# Patient Record
Sex: FEMALE | Race: WHITE | Employment: OTHER | URBAN - METROPOLITAN AREA
[De-identification: names, ages, dates, MRNs, and addresses within clinical notes are randomized per-mention and may not be internally consistent; named-entity substitution may affect disease eponyms.]

---

## 2017-02-22 ENCOUNTER — HOSPITAL ENCOUNTER (OUTPATIENT)
Dept: WOMENS IMAGING | Age: 82
Discharge: HOME | End: 2017-02-22
Admitting: INTERNAL MEDICINE

## 2017-02-22 DIAGNOSIS — M89.8X9 OTHER DISORDERS OF BONE AND CARTILAGE: ICD-10-CM

## 2017-02-22 DIAGNOSIS — M94.9 DISORDER OF CARTILAGE: ICD-10-CM

## 2017-02-22 DIAGNOSIS — Z12.31 VISIT FOR SCREENING MAMMOGRAM: ICD-10-CM

## 2017-02-22 DIAGNOSIS — M94.8X9 OTHER DISORDERS OF BONE AND CARTILAGE: ICD-10-CM

## 2017-04-27 ENCOUNTER — TELEPHONE (OUTPATIENT)
Dept: PULMONOLOGY | Age: 82
End: 2017-04-27

## 2017-04-27 DIAGNOSIS — J45.909 ASTHMA IN PEDIATRIC PATIENT, UNSPECIFIED ASTHMA SEVERITY, UNCOMPLICATED: Primary | ICD-10-CM

## 2017-05-08 ENCOUNTER — HOSPITAL ENCOUNTER (OUTPATIENT)
Dept: PULMONOLOGY | Age: 82
Discharge: HOME | End: 2017-05-08
Admitting: INTERNAL MEDICINE

## 2017-05-08 DIAGNOSIS — J45.909 ASTHMA IN PEDIATRIC PATIENT, UNSPECIFIED ASTHMA SEVERITY, UNCOMPLICATED: ICD-10-CM

## 2017-05-08 DIAGNOSIS — J44.1 CHRONIC OBSTRUCTIVE PULMONARY DISEASE WITH ACUTE EXACERBATION (HCC): ICD-10-CM

## 2017-05-08 DIAGNOSIS — J44.1 OBSTRUCTIVE CHRONIC BRONCHITIS WITH EXACERBATION (HCC): ICD-10-CM

## 2017-05-08 DIAGNOSIS — R06.02 BREATH SHORTNESS: ICD-10-CM

## 2017-05-08 LAB
DLCO %PRED: NORMAL
DLCO PRE: NORMAL
FEF 25-75%-POST: NORMAL
FEF 25-75%-PRE: NORMAL
FEV1-POST: NORMAL
FEV1-PRE: NORMAL
FEV1/FVC-POST: NORMAL
FEV1/FVC-PRE: NORMAL
FVC-POST: NORMAL
FVC-PRE: NORMAL
MEP: NORMAL
MIP: NORMAL
MVV %PRED-PRE: NORMAL
MVV-PRE: NORMAL
TLC %PRED: NORMAL
TLC PRE: NORMAL

## 2017-05-08 PROCEDURE — 94060 EVALUATION OF WHEEZING: CPT | Performed by: INTERNAL MEDICINE

## 2017-05-08 PROCEDURE — 94727 GAS DIL/WSHOT DETER LNG VOL: CPT | Performed by: INTERNAL MEDICINE

## 2017-05-08 PROCEDURE — 94729 DIFFUSING CAPACITY: CPT | Performed by: INTERNAL MEDICINE

## 2017-05-08 RX ORDER — ALBUTEROL SULFATE 90 UG/1
4 AEROSOL, METERED RESPIRATORY (INHALATION) ONCE
Status: COMPLETED | OUTPATIENT
Start: 2017-05-08 | End: 2017-05-08

## 2017-05-08 RX ADMIN — ALBUTEROL SULFATE 4 PUFF: 90 AEROSOL, METERED RESPIRATORY (INHALATION) at 12:02

## 2017-06-28 ENCOUNTER — OFFICE VISIT (OUTPATIENT)
Dept: PULMONOLOGY | Age: 82
End: 2017-06-28

## 2017-06-28 VITALS
DIASTOLIC BLOOD PRESSURE: 64 MMHG | SYSTOLIC BLOOD PRESSURE: 138 MMHG | HEIGHT: 60 IN | BODY MASS INDEX: 28.27 KG/M2 | TEMPERATURE: 98.2 F | HEART RATE: 66 BPM | OXYGEN SATURATION: 93 % | RESPIRATION RATE: 16 BRPM | WEIGHT: 144 LBS

## 2017-06-28 DIAGNOSIS — J44.9 CHRONIC BRONCHITIS WITH COPD (CHRONIC OBSTRUCTIVE PULMONARY DISEASE) (HCC): Primary | ICD-10-CM

## 2017-06-28 PROCEDURE — 99204 OFFICE O/P NEW MOD 45 MIN: CPT | Performed by: INTERNAL MEDICINE

## 2017-06-28 PROCEDURE — 4040F PNEUMOC VAC/ADMIN/RCVD: CPT | Performed by: INTERNAL MEDICINE

## 2017-06-28 PROCEDURE — 3023F SPIROM DOC REV: CPT | Performed by: INTERNAL MEDICINE

## 2017-06-28 PROCEDURE — 1090F PRES/ABSN URINE INCON ASSESS: CPT | Performed by: INTERNAL MEDICINE

## 2017-06-28 PROCEDURE — 1036F TOBACCO NON-USER: CPT | Performed by: INTERNAL MEDICINE

## 2017-06-28 PROCEDURE — G8926 SPIRO NO PERF OR DOC: HCPCS | Performed by: INTERNAL MEDICINE

## 2017-06-28 PROCEDURE — G8419 CALC BMI OUT NRM PARAM NOF/U: HCPCS | Performed by: INTERNAL MEDICINE

## 2017-06-28 PROCEDURE — G8427 DOCREV CUR MEDS BY ELIG CLIN: HCPCS | Performed by: INTERNAL MEDICINE

## 2017-06-28 RX ORDER — VALSARTAN 160 MG/1
TABLET ORAL
COMMUNITY
Start: 2017-05-23 | End: 2018-10-23 | Stop reason: ALTCHOICE

## 2017-06-28 RX ORDER — PRAVASTATIN SODIUM 40 MG
40 TABLET ORAL DAILY
COMMUNITY
Start: 2017-05-01 | End: 2021-02-04 | Stop reason: ALTCHOICE

## 2017-06-28 RX ORDER — ISOSORBIDE MONONITRATE 30 MG/1
30 TABLET, EXTENDED RELEASE ORAL DAILY
COMMUNITY
Start: 2017-05-26

## 2017-06-28 RX ORDER — DEXAMETHASONE 4 MG/1
TABLET ORAL 2 TIMES DAILY
COMMUNITY
Start: 2017-05-26 | End: 2018-08-07 | Stop reason: SDUPTHER

## 2017-06-28 RX ORDER — M-VIT,TX,IRON,MINS/CALC/FOLIC 27MG-0.4MG
1 TABLET ORAL DAILY
COMMUNITY
End: 2017-10-30 | Stop reason: ALTCHOICE

## 2017-06-28 RX ORDER — ASPIRIN 325 MG
325 TABLET ORAL DAILY
COMMUNITY
End: 2021-02-04 | Stop reason: DRUGHIGH

## 2017-06-28 RX ORDER — CALCIUM CARBONATE 500(1250)
500 TABLET ORAL 2 TIMES DAILY
COMMUNITY

## 2017-06-28 RX ORDER — LEVOTHYROXINE SODIUM 0.15 MG/1
150 TABLET ORAL DAILY
COMMUNITY
Start: 2017-05-29

## 2017-06-28 RX ORDER — SOTALOL HYDROCHLORIDE 80 MG/1
80 TABLET ORAL 2 TIMES DAILY
Status: ON HOLD | COMMUNITY
Start: 2017-05-26 | End: 2022-09-18 | Stop reason: HOSPADM

## 2017-06-28 RX ORDER — MONTELUKAST SODIUM 10 MG/1
10 TABLET ORAL NIGHTLY
COMMUNITY
Start: 2017-05-22 | End: 2022-09-16

## 2017-06-28 RX ORDER — IPRATROPIUM BROMIDE AND ALBUTEROL SULFATE 2.5; .5 MG/3ML; MG/3ML
1 SOLUTION RESPIRATORY (INHALATION) EVERY 6 HOURS PRN
COMMUNITY
End: 2017-06-28

## 2017-06-28 RX ORDER — ALENDRONATE SODIUM 70 MG/1
70 TABLET ORAL WEEKLY
COMMUNITY
Start: 2017-05-16

## 2017-06-28 RX ORDER — EZETIMIBE 10 MG/1
10 TABLET ORAL DAILY
COMMUNITY

## 2017-06-28 RX ORDER — MULTIVIT WITH MINERALS/LUTEIN
1000 TABLET ORAL DAILY
COMMUNITY

## 2017-06-28 RX ORDER — ALPRAZOLAM 0.25 MG/1
0.25 TABLET ORAL PRN
COMMUNITY
Start: 2017-04-26 | End: 2022-09-16

## 2017-06-28 RX ORDER — ESCITALOPRAM OXALATE 5 MG/1
5 TABLET ORAL DAILY
COMMUNITY
Start: 2017-05-18 | End: 2019-05-09 | Stop reason: ALTCHOICE

## 2017-06-28 RX ORDER — UMECLIDINIUM BROMIDE AND VILANTEROL TRIFENATATE 62.5; 25 UG/1; UG/1
POWDER RESPIRATORY (INHALATION)
COMMUNITY
Start: 2017-06-05 | End: 2017-12-14 | Stop reason: SDUPTHER

## 2017-07-05 ENCOUNTER — TELEPHONE (OUTPATIENT)
Dept: PULMONOLOGY | Age: 82
End: 2017-07-05

## 2017-07-05 DIAGNOSIS — J44.9 OBSTRUCTIVE CHRONIC BRONCHITIS WITHOUT EXACERBATION (HCC): Primary | ICD-10-CM

## 2017-07-05 DIAGNOSIS — J44.9 OBSTRUCTIVE CHRONIC BRONCHITIS WITHOUT EXACERBATION (HCC): ICD-10-CM

## 2017-07-05 RX ORDER — ALBUTEROL SULFATE 2.5 MG/3ML
SOLUTION RESPIRATORY (INHALATION)
Qty: 1650 ML | Refills: 3 | Status: SHIPPED | OUTPATIENT
Start: 2017-07-05 | End: 2018-09-28 | Stop reason: SDUPTHER

## 2017-07-05 RX ORDER — ALBUTEROL SULFATE 2.5 MG/3ML
2.5 SOLUTION RESPIRATORY (INHALATION) 4 TIMES DAILY
Qty: 120 EACH | Refills: 3 | Status: SHIPPED | OUTPATIENT
Start: 2017-07-05 | End: 2017-07-05 | Stop reason: SDUPTHER

## 2017-10-30 ENCOUNTER — OFFICE VISIT (OUTPATIENT)
Dept: PULMONOLOGY | Age: 82
End: 2017-10-30

## 2017-10-30 VITALS
WEIGHT: 156 LBS | HEIGHT: 60 IN | OXYGEN SATURATION: 94 % | SYSTOLIC BLOOD PRESSURE: 136 MMHG | HEART RATE: 70 BPM | TEMPERATURE: 98.1 F | BODY MASS INDEX: 30.63 KG/M2 | DIASTOLIC BLOOD PRESSURE: 82 MMHG | RESPIRATION RATE: 16 BRPM

## 2017-10-30 DIAGNOSIS — J44.9 CHRONIC BRONCHITIS WITH COPD (CHRONIC OBSTRUCTIVE PULMONARY DISEASE) (HCC): Primary | ICD-10-CM

## 2017-10-30 DIAGNOSIS — J31.0 CHRONIC RHINITIS, UNSPECIFIED TYPE: ICD-10-CM

## 2017-10-30 DIAGNOSIS — Z23 NEED FOR VACCINATION FOR PNEUMOCOCCUS: ICD-10-CM

## 2017-10-30 PROCEDURE — 1036F TOBACCO NON-USER: CPT | Performed by: INTERNAL MEDICINE

## 2017-10-30 PROCEDURE — G8427 DOCREV CUR MEDS BY ELIG CLIN: HCPCS | Performed by: INTERNAL MEDICINE

## 2017-10-30 PROCEDURE — 90670 PCV13 VACCINE IM: CPT | Performed by: INTERNAL MEDICINE

## 2017-10-30 PROCEDURE — G8926 SPIRO NO PERF OR DOC: HCPCS | Performed by: INTERNAL MEDICINE

## 2017-10-30 PROCEDURE — G8484 FLU IMMUNIZE NO ADMIN: HCPCS | Performed by: INTERNAL MEDICINE

## 2017-10-30 PROCEDURE — G0009 ADMIN PNEUMOCOCCAL VACCINE: HCPCS | Performed by: INTERNAL MEDICINE

## 2017-10-30 PROCEDURE — 4040F PNEUMOC VAC/ADMIN/RCVD: CPT | Performed by: INTERNAL MEDICINE

## 2017-10-30 PROCEDURE — 1090F PRES/ABSN URINE INCON ASSESS: CPT | Performed by: INTERNAL MEDICINE

## 2017-10-30 PROCEDURE — G8417 CALC BMI ABV UP PARAM F/U: HCPCS | Performed by: INTERNAL MEDICINE

## 2017-10-30 PROCEDURE — G8399 PT W/DXA RESULTS DOCUMENT: HCPCS | Performed by: INTERNAL MEDICINE

## 2017-10-30 PROCEDURE — 99213 OFFICE O/P EST LOW 20 MIN: CPT | Performed by: INTERNAL MEDICINE

## 2017-10-30 PROCEDURE — 3023F SPIROM DOC REV: CPT | Performed by: INTERNAL MEDICINE

## 2017-10-30 PROCEDURE — 1123F ACP DISCUSS/DSCN MKR DOCD: CPT | Performed by: INTERNAL MEDICINE

## 2017-10-30 NOTE — PROGRESS NOTES
PH, PCO2, PO2, HCO3, BE, O2SAT in the last 72 hours. Chest imaging reports were reviewed and imaging was reviewed by me and showed   5.8. 17. Ct chest.  No emphysema. Expiratory phase of high-resolution CT suboptimal.    Assessment:       · Chronic bronchitis versus asthmatic bronchitis  · Chronic rhinitis    Plan:         1. Chronic bronchitis with COPD (chronic obstructive pulmonary disease) ([address]  She is having increasing symptoms with Anoro and Flovent bid. Since she has excess Flovent, will not change this. Will assess for o2 need with 6 MWT and attempt to improve symptoms with pulm rehab. - 6 MIN WALK TEST  - Ambulatory referral to Pulmonary Rehab    2. Need for vaccination for pneumococcus  - Pneumococcal conjugate vaccine 13-valent IM (PREVNAR 13)    3. Chronic rhinitis  Currently controlled. No change.

## 2017-10-30 NOTE — Clinical Note
I am going to assess for oxygen need with 6 min walk test and refer to pulm rehab. See full note attached.   Thanks, Yasmin Fletcher

## 2017-11-17 ENCOUNTER — HOSPITAL ENCOUNTER (OUTPATIENT)
Dept: CARDIAC REHAB | Age: 82
Discharge: HOME | End: 2017-11-17
Attending: INTERNAL MEDICINE | Admitting: INTERNAL MEDICINE

## 2017-11-17 DIAGNOSIS — J44.1 CHRONIC OBSTRUCTIVE PULMONARY DISEASE WITH ACUTE EXACERBATION (HCC): ICD-10-CM

## 2017-11-17 DIAGNOSIS — J44.9 COPD, MILD (HCC): ICD-10-CM

## 2017-11-17 PROCEDURE — 94620 PR PULMONARY STRESS TESTING,SIMPLE: CPT | Performed by: INTERNAL MEDICINE

## 2017-11-21 ENCOUNTER — HOSPITAL ENCOUNTER (OUTPATIENT)
Dept: CARDIAC REHAB | Age: 82
Discharge: HOME | End: 2017-11-30
Attending: INTERNAL MEDICINE | Admitting: INTERNAL MEDICINE

## 2017-11-21 VITALS — BODY MASS INDEX: 29.29 KG/M2 | WEIGHT: 150 LBS

## 2017-11-21 PROBLEM — J44.9 COPD, MILD (HCC): Status: ACTIVE | Noted: 2017-11-21

## 2017-11-21 NOTE — PROGRESS NOTES
expect this patient to improve in a reasonable and predictable time frame. Other Program Components:   (X)6 Minute Walk Test (6 MWT) at program discharge   (X)Evaluation for oxygen needs while exercising   (X)Titrate Oxygen to maintain >88 SpO2   (X)Stop Exercise or Apply Oxygen if patient desaturates <88%   (x)May continue in the Maintenance Program upon completion    Measurable Endurance Goal:    -Aerobic endurance goal to be measured in minutes. Start endurance training per patient tolerance at 1-15 minutes per exercise type, progressing to a total of 31-60 minutes using various modes of training (see Exercise Prescription on Individualized Treatment Plan 'ITP'). -Measurable Muscular Strength Goal: Starting at 1-3 lbs x 8 reps, progressing daily/weekly to 5-10 lbs x 15 reps    NOTE: Juan Kimbrough's tobacco History:   History   Smoking Status    Former Smoker    Types: Cigarettes   Smokeless Tobacco    Never Used     Comment: quit 45 years ago - socially     If patient is a current smoker, patient will participate in tobacco cessation program during Pulmonary Rehab.       Physician Signature/Date/Time:

## 2017-11-30 ENCOUNTER — HOSPITAL ENCOUNTER (OUTPATIENT)
Dept: CARDIAC REHAB | Age: 82
Discharge: HOME | End: 2017-12-01
Admitting: INTERNAL MEDICINE

## 2017-11-30 VITALS — BODY MASS INDEX: 29.8 KG/M2 | WEIGHT: 152.6 LBS

## 2017-11-30 NOTE — PROGRESS NOTES
Exercise Class  []  Attended Resistance Training Class                                                    GOALS                                                                                           Rate your physical   fitness (PF)?:   /10    -30 day PF goal:  /10    EDUCATION  [] Attended all individually relevant exercise education sessions? []  Knows current exercise goals and recmndtns?:                                                  Goals Achieved? Rate your physical fitness now?:     /10  Handgrip:  Right:  Left:    Discharge  EDUCATION  []  Attended all individually relevant exercise education sessions?    [] Knows current exercise goals and recmndtns?:                                                                                        PHYSICIAN DIRECTED   EXERCISE RX     RPE : 11 - 14  Titrate O2 to keep SpO2 >89%    Frequency (F): 2-3x/wk in Rehab,         Initial Intensity : 2.4 METs (from 6MWT)   3.4-4.6 (60-80% of walk speed for TM)    Type (T): Aerobic (TM,NS, SF, AE, AB, RB, EL, Arc), RT 1-3#, 8-16 reps    CAN USE ALL EQUIPMENT EXCEPT       Time (Ti): Aerobic:   15-40 min               Progression: 1-2 min total time for TM, AB, NS, SF or Arm ergometer per session or when a steady state has occurred in RPE if  SpO2 and HR levels are acceptable           PHYSICIAN DIRECTED   EXERCISE RX       Titrate O2 to keep SpO2 >89%    Frequency (F): 2-3x/wk in Rehab, 1-3x/wk home walking       Intensity (I):  Initial + 5-10% increase each week or when a steady state has occurred in RPE if  SpO2 and HR levels are acceptable  Type (T)  Aerobic (TM,NS, SFR,SFS, AE, AB, RB), RT   8-16 reps    CAN USE ALL EQUIPMENT EXCEPT        Time (Ti): Aerobic:   30-40 min        Progression:   1-2 min total time for TM, AB, NS, SF or Arm ergometer per session or when a steady state has occurred in RPE if  SpO2 and HR levels are acceptable        Is pt. progressing in rehab?:               PHYSICIAN DIRECTED   EXERCISE RX       Titrate O2 to keep SpO2 >89%    Frequency (F): 2-3x/wk in Rehab, 1-3x/wk home walking       Intensity (I):  Initial + 5-10% increase each week when a steady state has occurred in RPE if  SpO2 and HR levels are acceptable  Type (T)  Aerobic (TM,NS, SFR,SFS, AE, AB, RB), RT   8-16 reps    CAN USE ALL EQUIPMENT EXCEPT        Time (Ti): Aerobic:   30-50 min     Progression:   1-2 min total time for TM, AB, NS, SF or Arm ergometer per session or when a steady state has occurred in RPE if  SpO2 and HR levels are acceptable              Is pt. progressing in rehab?:                 PHYSICIAN DIRECTED   EXERCISE RX       Titrate O2 to keep SpO2 >89%    Frequency (F): 2-3x/wk in Rehab, 1-3x/wk home walking       Intensity (I):  Initial + 5-10% increase each week when a steady state has occurred in RPE if  SpO2 and HR levels are acceptable  Type (T):   Aerobic (TM,NS, SFR,SFS, AE, AB, RB), RT   8-16 reps    CAN USE ALL EQUIPMENT EXCEPT          Time (Ti): Aerobic:   30-58 min         Progression:   1-2 min total time for TM, AB, NS, SF or Arm ergometer per session or when a steady state has occurred in RPE if  SpO2 and HR levels are acceptable            Is pt. progressing in rehab?:               Final Intensity (I): See below and final 6MWT above            ACHIEVED TOTAL AEROBIC EXERCISE TIME:  min         FINAL LEVELS:  [] TM: min  [] Nustep: level  min  [] Arm ergometer: ureña min  [] Scifit: level min  [] HW :  # x  reps  [] Dy:    X   reps  [] Cybex RT:    # x   Reps  [] Eliptical:level  X  Min  [] Arc: level   X    mins  [] RB:  Level  X   mins  [] AB: level   X     Min              Did pt. progress in rehab?:     30 DAY TARGET GOAL  [x] Start slowly but progress each week  [x] Increase duration on the equipment  [x] Start resistance training    -30 day PF goal: 7/10                 Current Home Exercise :None    Frequency:      Type:                     Health Knowledge   Test Score:  24/25     Current Home Exercise:   Frequency:      Type:         Time:  min                                  Current Home Exercise:   Frequency:       Type:         Time:  min                                Current Home Exercise:   Frequency:       Type:         Time:  min                                                      Discharge exercise plan                                  Repeat Health Knowledge Test Score :    /25     [facility] INDIVIDUAL TREATMENT PLAN  SMOKING AND   OTHER COMPONENTS    Smoking/Other  Components   Initial Assessment  Day 1 Smoking/Other  Components  Intervention  Day 2-30 Smoking/Other  Components  Re-Assessment  Day 31-60 Smoking/Other  Components  Re-Assessment Day 61-90+ Smoking/Other  Components  Discharge  Final Day   Tobacco Use Hx  [x] Y  [] N?:   Quit Date: 2007  Cig/Day: 5-6/day  Years:20  Tobacco Use  Any change in Smoking Status?:  No  []  not smoking  Quit Date:   (if applicable)  Intervention  []  Information/ed material given on cessation techniques  []  smoking cessation class schedule given Tobacco Use  Any change in Smoking Status?:      Quit Date:   (if applicable)          Intervention  [] Referral to Tobacco Cessation Specialist (TCS) Tobacco Use  Any change in Smoking Status?:     Quit Date:   (if applicable)        Intervention Tobacco Use  Any change in Smoking Status?:     Quit Date:   (if applicable)          Intervention Tobacco Use  Any change in Smoking Status?:   Quit Date:   (if applicable)            Intervention  [] Pt used TCS counseling           Other  Components:    [x]  Environmental Factors    [x]  Prevention Management of Exacerbations    []  CHF Management    []  Hypertension Management     Intervention/  Education                [facility] INDIVIDUAL TREATMENT PLAN  OXYGEN AND MEDICATIONS    OXYGEN  MEDICATIONS   Initial Assessment  Day 1 prescribed meds    Respiratory Medications    []  Proper use and technique of MDI, DPI and/or nebs  []  Incorrect use and technique of MDI, DPI and /or nebs    Hypoxemia  Problem:      Current Oxygen Prescription:    l/min rest   l/min exercise   titration plan/weaning plan:    Goals:   []  Manage Hypoxemia  []  receive adequate portable system  [] Compliant with use as prescribed  []  Learn O2 safety guidelines     Medications    [] Compliant  []  Noncompliant       Respiratory Medications    []  Compliant  [] Noncompliant              Hypoxemia  Problem:    []  Compliant  []  Noncompliant    Final Oxygen Prescription:    l/min rest   l/min exercise                                                           [facility] INDIVIDUAL TREATMENT PLAN  NUTRITION DOMAIN:        NUTRITION   Initial Assessment  Day 1 NUTRITION   Intervention  Day 2-30 NUTRITION   Re-Assessment  Day 31-60 NUTRITION   Re-Assessment Day 61-90+ NUTRITION Discharge  Final Day   Weight Management:  150 lbs  Current BMI: 32.5 Weight Management:   lbs  Current BMI Weight Management:    lbs  Current BMI Weight Management:    lbs  Current BMI Weight Management:    lbs  Current BMI   Diabetes?: No  A1C   Fasting BS:   Insulin?:    Oral agent? Diabetes:  If y, has patient seen a positive trend in FBS?:      Intervention    [x] Basic nutrition education   [] Referral to Diabetes Education? [] Referral to weightloss/nutrition education     Intervention    [] Pt has had Nutrition class? [] Informal questions asked regarding nutrition/weight control Intervention    []  Pt has had Nutrition class? [] Questions addressed Intervention    []  Pt has had Nutrition class?    Intervention    Pt aware of:     [] Pulmonary eating techniques   [] Smart choices, Calories   []Gas-producing foods   [] Wt gain / loss strategies     GOALS    Weight Loss         30 DAY TARGET GOAL:    [] Decrease portion size by 250-500 calories/day  [] Increase fluid intake to 6-8 mood: GOALS        Did pt meet previous 30 day [employer] Goal(s)?:      RE- ASSESSMENT GOAL    [] Decrease/  Maintain anxiety and depression level  [] Increase ability to complete ADL  -          (0 being 'None', 10 being 'Very'),  -How would you rate your Anxiety Level:      -How would you rate your level of depression:    -How would you rate your mood:     Discharge            Did pts SF-36 Mental Score increase?:          [] Pt is aware of the s/s of depression    [] Received Psychosocial Education    Any follow up with physicians  necessary?:      [] Y    [] N               [facility] INDIVIDUAL TREATMENT PLAN  EDUCATION DOMAIN:    EDUCATION   Initial Assessment  Day 1 EDUCATION   Intervention  Day 2-30 EDUCATION   Re-Assessment  Day 31-60 EDUCATION   ReAssessment Day 61-90+ EDUCATION Discharge  Final Day    11/21/17                  Education  [x] Equipment/Staff Orientation  [x] Breathing Retraining  [x] Importance of Clean Hands    Chronic Cough?:   [x] Y   []  N  Spacer?:   [x]   Y   []  N    Sleep Apnea?:  [] Y    [x] N     [x] Education Book given       Education  Individual instruction  [] PLB  [] RPD/RPE   [] O2 use  []  review PFT  [] Inhalers   []Home Activity/Warm up/ stretches  Attend Classes:  [] Nutrition  [] Panic control, relaxation, managing depression  [] A&P of the Respiratory System   [] Environ. Issues+ Travel   [] Bronchial Hygiene / Preventing Infection  [] Resistance Training  []  Benefits of Exercise  [] Energy Cons        Education  Individual instruction  [] PLB  [] RPD/RPE   [] O2 use  []  review PFT  [] Inhalers   [] Home Activity/Warm up/ stretches  Attend Classes:  [] Nutrition  []  Panic conntrol, relaxation, managing depression  []  A&P of the Respiratory System   [] Environ. Issues+ Travel   [] Bronchial Hygiene / Preventing Infection  []  Resistance Training  [] Benefits of Exercise  [] Energy Cons    Education  Individual instruction  [] PLB  [] RPD/RPE   []  O2 use  []  review PFT  [] Inhalers   [] Home Activity/Warm up/ stretches  Attend Classes:  [] Nutrition  []  Panic conntrol, relaxation, managing depression  [] A&P of the Respiratory System   [] Environ. Issues+ Travel   [] Bronchial Hygiene / Preventing Infection  []  Resistance Training  [] Benefits of Exercise  [] Energy Cons   Education  []  Addressed pt questions on Education Topics                                                         Physician Interaction / Response:  (If none, continue on present care plan)     Physician Interaction / Response:   (If none, continue on present care plan)   Physician Interaction / Response:   (If none, continue on present care plan)   Physician Interaction / Response:   (If none, continue on present care plan)   Physician Interaction / Response:       Discharge the patient. Rehab Potential:  []  Good [] Fair [] Poor    Summary: Initial Progress Report 11/21/17: Lupis Florian is a 80 yr old female with a HX of HTN, CAD, PCI/stent placement and Mild COPD. She was referred to Pulmonary Rehab for having increasing shortness of breath with exertion over the past 6mo which has led to her becoming easily fatigued and deconditioned. She will attend [facility] twice weekly. Kai Orozco will attend Sessions of [facility] Phase 2    Exercise: Kai Orozco will attend 15-45 min of aerobic exercise. Time and intensity will increase based on patient's reported RPE. Oxygen: None    Medications: 1. Betapace 80mg daily                       2. Albuterol HHN qid      Nutrition: Wt. 150lb, BMI 32.5. Kai Orozco will attend a General Nutrition class during [facility]. Psychosocial including Dyspnea with ADL's, Depression/Anxiety and Social Functioning: Kai Orozco lives alone in a condo, does her own cooking and light household chores but has someone come in weekly to do majority of the cleaning. Her  passed away in 2015 and she admits to having some mild depression since his passing which she currently takes Lexapro for.

## 2017-12-01 ENCOUNTER — HOSPITAL ENCOUNTER (OUTPATIENT)
Dept: OTHER | Age: 82
Discharge: HOME | End: 2017-12-31
Attending: INTERNAL MEDICINE | Admitting: INTERNAL MEDICINE

## 2017-12-05 ENCOUNTER — HOSPITAL ENCOUNTER (OUTPATIENT)
Dept: CARDIAC REHAB | Age: 82
Discharge: HOME | End: 2017-12-06
Admitting: INTERNAL MEDICINE

## 2017-12-05 VITALS — BODY MASS INDEX: 29.29 KG/M2 | WEIGHT: 150 LBS

## 2017-12-05 NOTE — PROGRESS NOTES
[facility] [facility] Facility-Based Therapy for COPD  INDIVIDUAL TREATMENT PLAN (ITP)      NAME: Raina Crigler  STELLA VALDESB: 11/26/1932  Account Number: [de-identified]  AGE: 80 y.o. 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 PFT: FEV1/FVC actual: 91 FEV1: 77 FVC: 97  Notes: Medicare requirements for Pulmonary Rehabilitation include a PFT within the last 12 months revealing: FEV1/FVC <70, and FEV1 <80%, and documentation from the referring physician stating that the patient has quit smoking or will participate in smoking cessation activities prior to, or during the Pulmonary Rehab program.  A 6 Minute Walk Test (6 MWT) at the beginning and end of the program is also indicated.   GLOSSARY: PF= Physical Fitness  TM=Treadmill  AD= [employer] Air[employer] Bike  UBE=Upperbody Ergometry LBE=Lowerbody Ergometer  NS=NuStep SF= SciFit  ST=Step Training  RT=Resistance Training   PLB=Pursed Lip Breathing   DY= Dynabands  HW= Handweights   Cybex RT= Cybex Mult istation weight machine   RB=Recumbent     REFERRING PHYSICIAN: Dr. Link Escobar        Medical History:                Past Medical History        Past Medical History:   Diagnosis Date    CAD (coronary artery disease)      Hypertension              Surgical History:                Past Surgical History         Past Surgical History:   Procedure Laterality Date    CORONARY ANGIOPLASTY WITH STENT PLACEMENT         1998            Major Symptoms:                 Cough/sputum             Yes/frequent dry cough                            Wheezing                    No                 Chest Discomfort        No                 Orthopnea                   No                 Sleep Disturbances     No                 Edema                         No                 Dyspnea                      Yes with exertion     Oxygen Therapy: N/A                 Home O2           lpm                []  Prn                 [] continuous short of breath.     2. Would like to gain some strength.                                            Learning Barrier(s)  [] Speech           [] Literacy              [] Hearing          [] Cognitive            [] Vision             [x]  Ready to Learn              Balance Issues  [] Y  [x] N                 Orthopedic Issues  []  Y[x]  N           Rate your Physical   Fitness (PF)?    3-4/10     Handgrip:  Right: 7  Left: 6     EDUCATION  [x]  Staff Introduction  []  Proper setup/O2 use  [x]  Equipment [place]  [x]  RPD/RPE Explain  [x]  SpO2/HR Explain  [x]  Breathing Retraining  [x]  Explain Intensity  [x] Initial Levels set GOALS                                                           If Patient has a Learning Barrier, action:                          If pt has balance or orthopedic issues:  Interventions:                             Rate your physical   fitness (PF)?:   /10           -30 day PF goal:  /10     EDUCATION  [] Understands proper set/up O2 use  []  Understands SpO2 and RPD? [] Aerobic progression explained? []  Intensity progression explained?           GOALS                                                                                                                                      Rate your physical   fitness (PF)?:   /10           -30 day PF goal:  /10     EDUCATION   []  Home Activity education offered  [] Stretching/ Flexibility education offered  [] Attended Benefits of Exercise Class  []  Attended Resistance Training Class                                                                         GOALS                                                                                                                                      Rate your physical   fitness (PF)?:   /10     -30 day PF goal:  /10     EDUCATION  [] Attended all individually relevant exercise education sessions?    []  Knows current exercise goals and recmndtns?:                                                                   Goals Achieved?                                                                                                                                   Rate your physical fitness now?:     /10  Handgrip:  Right:  Left:     Discharge  EDUCATION  []  Attended all individually relevant exercise education sessions? [] Knows current exercise goals and recmndtns? :                                                                                                                               PHYSICIAN DIRECTED   EXERCISE RX      RPE : 11 - 14  Titrate O2 to keep SpO2 >89%     Frequency (F): 2-3x/wk in Rehab,            Initial Intensity : 2.4 METs (from 6MWT)   3.4-4.6 (60-80% of walk speed for TM)     Type (T): Aerobic (TM,NS, SF, AE, AB, RB, EL, Arc), RT 1-3#, 8-16 reps     CAN USE ALL EQUIPMENT EXCEPT         Time (Ti): Aerobic:   15-40 min                     Progression: 1-2 min total time for TM, AB, NS, SF or Arm ergometer per session or when a steady state has occurred in RPE if  SpO2 and HR levels are acceptable                PHYSICIAN DIRECTED   EXERCISE RX         Titrate O2 to keep SpO2 >89%     Frequency (F): 2-3x/wk in Rehab, 1-3x/wk home walking         Intensity (I):  Initial + 5-10% increase each week or when a steady state has occurred in RPE if  SpO2 and HR levels are acceptable  Type (T)  Aerobic (TM,NS, SFR,SFS, AE, AB, RB), RT   8-16 reps     CAN USE ALL EQUIPMENT EXCEPT           Time (Ti): Aerobic:   30-40 min          Progression:   1-2 min total time for TM, AB, NS, SF or Arm ergometer per session or when a steady state has occurred in RPE if  SpO2 and HR levels are acceptable           Is pt. progressing in rehab?:                  PHYSICIAN DIRECTED   EXERCISE RX         Titrate O2 to keep SpO2 >89%     Frequency (F): 2-3x/wk in Rehab, 1-3x/wk home walking         Intensity (I):  Initial + 5-10% increase each week when a steady state has occurred in RPE if  SpO2 and HR levels are acceptable  Type (T)  Aerobic (TM,NS, SFR,SFS, AE, AB, RB), RT   8-16 reps     CAN USE ALL EQUIPMENT EXCEPT           Time (Ti): Aerobic:   30-50 min      Progression:   1-2 min total time for TM, AB, NS, SF or Arm ergometer per session or when a steady state has occurred in RPE if  SpO2 and HR levels are acceptable                    Is pt. progressing in rehab?:                     PHYSICIAN DIRECTED   EXERCISE RX         Titrate O2 to keep SpO2 >89%     Frequency (F): 2-3x/wk in Rehab, 1-3x/wk home walking         Intensity (I):  Initial + 5-10% increase each week when a steady state has occurred in RPE if  SpO2 and HR levels are acceptable  Type (T):   Aerobic (TM,NS, SFR,SFS, AE, AB, RB), RT   8-16 reps     CAN USE ALL EQUIPMENT EXCEPT              Time (Ti): Aerobic:   30-58 min            Progression:   1-2 min total time for TM, AB, NS, SF or Arm ergometer per session or when a steady state has occurred in RPE if  SpO2 and HR levels are acceptable                 Is pt. progressing in rehab?:                  Final Intensity (I): See below and final 6MWT above                 ACHIEVED TOTAL AEROBIC EXERCISE TIME:  min            FINAL LEVELS:  [] TM: min  [] Nustep: level  min  [] Arm ergometer: ureña min  [] Scifit: level min  [] HW :  # x  reps  [] Dy:    X   reps  [] Cybex RT:    # x   Reps  [] Eliptical:level  X  Min  [] Arc: level   X    mins  [] RB:  Level  X   mins  [] AB: level   X     Min                    Did pt. progress in rehab?:   30 DAY TARGET GOAL  [x] Start slowly but progress each week  [x] Increase duration on the equipment  [x] Start resistance training     -30 day PF goal: 7/10                       Current Home Exercise :None     Frequency:        Type:                              Health Knowledge   Test Score:  24/25       Current Home Exercise:   Frequency:       Type:            Time:  min                                         Day 61-90+ OXYGEN  MEDICATION  Discharge  Final Day   11/21/17           Medications  [x]  Uses as prescribed  []  Non- compliant with prescribed meds     Respiratory Medications     [x]  Proper use   and technique of MDI, DPI and/or nebs  []  Incorrect use and technique of MDI, DPI and /or nebs     Hypoxemia  Problem:     [x]  No problem  [] Noncompliant with O2 use  []  Oxygen in [place] only  []  No home O2  []  No portable O2  []  Needs O2 prescription and O2 qualifying data sent for setup   ()Poor knowledge of O2 use/safety     Current Oxygen Prescription:    l/min rest   l/min exercise   titration   plan/weaning plan:  CPAP/BIPAP:     Goals:      []  Manage Hypoxemia  []  receive adequate portable system  []  Compliant with use as prescribed  []  Learn O2 safety guidelines Medications  []  Uses as prescribed  []  Non- compliant with prescribed meds     Respiratory Medications     [] Proper use and technique of MDI, DPI and/or nebs  []  Incorrect use and technique of MDI, DPI and /or nebs     Hypoxemia  Problem:        Current Oxygen Prescription:    l/min rest   l/min exercise   titration plan/weaning plan:     Goals:   []  Manage Hypoxemia  []  receive adequate portable system  []  Compliant with use as prescribed  []  Learn O2 safety guidelines            Medications  []  Uses as prescribed  [] Non- compliant with prescribed meds     Respiratory Medications     []  Proper use and technique of MDI, DPI and/or nebs  [] Incorrect use and technique of MDI, DPI and /or nebs     Hypoxemia  Problem:        Current Oxygen Prescription:    l/min rest   l/min exercise   titration plan/weaning plan:     Goals:   []  Manage Hypoxemia  []  receive adequate portable system  []  Compliant with use as prescribed  [] Learn O2 safety guidelines             Medications  []  Uses as prescribed  []  Non- compliant with prescribed meds     Respiratory Medications     []  Proper use and technique of MDI, DPI and/or nebs  []  Incorrect use and technique of MDI, DPI and /or nebs     Hypoxemia  Problem:        Current Oxygen Prescription:    l/min rest   l/min exercise   titration plan/weaning plan:     Goals:   []  Manage Hypoxemia  []  receive adequate portable system  [] Compliant with use as prescribed  []  Learn O2 safety guidelines    Medications     [] Compliant  []  Noncompliant        Respiratory Medications     []  Compliant  [] Noncompliant                    Hypoxemia  Problem:     []  Compliant  []  Noncompliant     Final Oxygen Prescription:    l/min rest   l/min exercise                                                                                    [facility] INDIVIDUAL TREATMENT PLAN  NUTRITION DOMAIN:         NUTRITION   Initial Assessment  Day 1 NUTRITION   Intervention  Day 2-30 NUTRITION   Re-Assessment  Day 31-60 NUTRITION   Re-Assessment Day 61-90+ NUTRITION Discharge  Final Day   Weight Management:  150 lbs  Current BMI: 32.5 Weight Management:   lbs  Current BMI Weight Management:    lbs  Current BMI Weight Management:    lbs  Current BMI Weight Management:    lbs  Current BMI   Diabetes?: No  A1C   Fasting BS:   Insulin?:    Oral agent?   Diabetes:  If y, has patient seen a positive trend in FBS?:        Intervention     [x] Basic nutrition education   [] Referral to Diabetes Education? [] Referral to weightloss/nutrition education     Intervention     [] Pt has had Nutrition class? [] Informal questions asked regarding nutrition/weight control Intervention     []  Pt has had Nutrition class? [] Questions addressed Intervention     []  Pt has had Nutrition class?  Intervention     Pt aware of:     [] Pulmonary eating techniques   [] Smart choices, Calories   []Gas-producing foods   [] Wt gain / loss strategies   GOALS     Weight Loss          30 DAY TARGET GOAL:     [] Decrease portion size by 250-500 calories/day  [] Increase fluid intake to 6-8 8oz.  [] Eat less sweets        Reasonable, achievable 30 day weight Re-Assessment  [] S/S of depression present? If [x] , action:          Med Changes?:   [] Y   [] N           Is the patient tolerating the intensity and duration of the VT program?:     []  Y    [] N  Re-Assessment  Psych Issue notes:           Is the patient receiving support for the VT program at home?:  []  Y  [] N     Is the patient tolerating the intensity and duration of the VT program?:  []  Y   []  N    Final Assessment                            Results of any Physician/ Counselor Intervention?:      [] Y   [] N         GOALS           30 DAY TARGET GOAL     [x]  Assess Mental Score using   SF-36 and PHQ-9   [x]  Teach PLB  [x]  Reassure pt that (s)he can stop and rest, adjust the speeds, and/or switch modalities from our suggestions  -     (0 being 'None', 10 being 'Very'),  -How would you rate your Anxiety Level: 5/10        -How would you rate your level of  depression: 5/10     -How would you rate your mood: (0 is negative all the time, 10 is positive all the time):  8           ADL's  Assess PF score on SF-36     Score= 35     From [facility], ADL pt struggles with most: Laundry     Out of 10, rate your ability to do that ADL now. 7/8    GOALS           Did pt meet Initial 30 day   Target Goal(s)? :         30 DAY NEW TARGET GOAL     [] Decrease/Maintain anxiety and depression level  [] Increase ability to complete ADL  [] Encourage pt to rate exercises truthfully to encourage correct intensity / duration prescription  -  (0 being 'None', 10 being 'Very'),  -How would you rate your Anxiety Level:        -How would you rate your level of depression:         -How would you rate your mood: GOALS           Did pt meet previous 30 day Target Goal(s)? :        RE- ASSESSMENT GOAL     [] Decrease/  Maintain anxiety and depression level  [] Increase ability to complete ADL  -              (0 being 'None', 10 being 'Very'),  -How would you rate your Anxiety Level:        -How would you rate your level of depression:     -How would you rate your mood: GOALS           Did pt meet previous 30 day Target Goal(s)? :        RE- ASSESSMENT GOAL     [] Decrease/  Maintain anxiety and depression level  [] Increase ability to complete ADL  -              (0 being 'None', 10 being 'Very'),  -How would you rate your Anxiety Level:        -How would you rate your level of depression:     -How would you rate your mood:    Discharge                 Did pts SF-36 Mental Score increase?:             [] Pt is aware of the s/s of depression     [] Received Psychosocial Education     Any follow up with physicians  necessary?:       [] Y    [] N                  [facility] INDIVIDUAL TREAT[facility] PLAN  EDUCATION DOMAIN:     EDUCATION   Initial Assessment  Day 1 EDUCATION   Intervention  Day 2-30 EDUCATION   Re-Assessment  Day 31-60 EDUCATION   ReAssessment Day 61-90+ EDUCATION Discharge  Final Day    11/21/17           Education  [x] Equipment/Staff Orientation  [x] Breathing Retraining  [x] Importance of Clean Hands     Chronic Cough?:   [x] Y   []  N  Spacer?:   [x]   Y   []  N     Sleep Apnea?:  [] Y    [x] N      [x] Education Book given       Education  Individual instruction  [] PLB  [] RPD/RPE   [] O2 use  []  review PFT  [] Inhalers   []Home Activity/Warm up/ stretches  Attend Classes:  [] Nutrition  [] Panic control, relaxation, managing depression  [] A&P of the Respiratory System   [] Environ. Issues+ Travel   [] Bronchial Hygiene / Preventing Infection  [] Resistance Training  []  Benefits of Exercise  [] Energy Cons       Education  Individual instruction  [] PLB  [] RPD/RPE   [] O2 use  []  review PFT  [] Inhalers   [] Home Activity/Warm up/ stretches  Attend Classes:  [] Nutrition  []  Panic conntrol, relaxation, managing depression  []  A&P of the Respiratory System   [] Environ. Issues+ Travel   [] Bronchial Hygiene / Preventing Infection  []  Resistance Training  [] Benefits of Exercise  [] Energy Cons  Education  Individual instruction  [] PLB  [] RPD/RPE   []  O2 use  []  review PFT  [] Inhalers   [] Home Activity/Warm up/ stretches  Attend Classes:  [] Nutrition  []  Panic conntrol, relaxation, managing depression  [] A&P of the Respiratory System   [] Environ. Issues+ Travel   [] Bronchial Hygiene / Preventing Infection  []  Resistance Training  [] Benefits of Exercise  [] Energy Cons Education  []  Addressed pt questions on Education Topics                                                                                Physician Interaction / Response:  (If none, continue on present care plan)    Physician Interaction / Response:   (If none, continue on present care plan) Physician Interaction / Response:   (If none, continue on present care plan) Physician Interaction / Response:   (If none, continue on present care plan) Physician Interaction / Response:        Discharge the patient.         Rehab Potential:  []  Good [] Fair [] Poor     Summary: Initial Progress Report 11/21/17: Mian Velasco is a 80 yr old female with a HX of HTN, CAD, PCI/stent placement and Mild COPD. She was referred to Pulmonary Rehab for having increasing shortness of breath with exertion over the past 6mo which has led to her becoming easily fatigued and deconditioned. She will attend SD twice weekly.      Joseline Liner will attend Sessions of SD Phase 2     Exercise: Joseline Falconr will attend 15-45 min of aerobic exercise. Time and intensity will increase based on patient's reported RPE.     Oxygen: None     Medications: 1. Betapace 80mg daily                       2. Albuterol HHN qid        Nutrition: Wt. 150lb, BMI 32.5. Joseline Cintron will attend a General Nutrition class during SD.     Psychosocial including Dyspnea with ADL's, Depression/Anxiety and Social Functioning: Joseline Cintron lives alone in a condo, does her own cooking and light household chores but has someone come in weekly to do majority of the cleaning.   Her  passed away in 2015 and she admits to having

## 2017-12-07 ENCOUNTER — HOSPITAL ENCOUNTER (OUTPATIENT)
Dept: CARDIAC REHAB | Age: 82
Discharge: HOME | End: 2017-12-08
Admitting: INTERNAL MEDICINE

## 2017-12-07 VITALS — BODY MASS INDEX: 29.29 KG/M2 | WEIGHT: 150 LBS

## 2017-12-12 ENCOUNTER — HOSPITAL ENCOUNTER (OUTPATIENT)
Dept: CARDIAC REHAB | Age: 82
Discharge: HOME | End: 2017-12-13
Admitting: INTERNAL MEDICINE

## 2017-12-12 VITALS — WEIGHT: 150.6 LBS | BODY MASS INDEX: 29.41 KG/M2

## 2017-12-14 RX ORDER — UMECLIDINIUM BROMIDE AND VILANTEROL TRIFENATATE 62.5; 25 UG/1; UG/1
POWDER RESPIRATORY (INHALATION)
Qty: 1 EACH | Refills: 3 | Status: SHIPPED | OUTPATIENT
Start: 2017-12-14 | End: 2018-04-05 | Stop reason: SDUPTHER

## 2017-12-19 ENCOUNTER — HOSPITAL ENCOUNTER (OUTPATIENT)
Dept: CARDIAC REHAB | Age: 82
Discharge: HOME | End: 2017-12-20
Admitting: INTERNAL MEDICINE

## 2017-12-19 VITALS — WEIGHT: 151 LBS | BODY MASS INDEX: 29.49 KG/M2

## 2017-12-21 ENCOUNTER — HOSPITAL ENCOUNTER (OUTPATIENT)
Dept: CARDIAC REHAB | Age: 82
Discharge: HOME | End: 2017-12-22
Admitting: INTERNAL MEDICINE

## 2017-12-21 VITALS — WEIGHT: 149.5 LBS | BODY MASS INDEX: 29.2 KG/M2

## 2017-12-26 ENCOUNTER — HOSPITAL ENCOUNTER (OUTPATIENT)
Dept: CARDIAC REHAB | Age: 82
Discharge: HOME | End: 2017-12-27
Admitting: INTERNAL MEDICINE

## 2017-12-26 VITALS — BODY MASS INDEX: 29.31 KG/M2 | WEIGHT: 150.1 LBS

## 2017-12-28 ENCOUNTER — HOSPITAL ENCOUNTER (OUTPATIENT)
Dept: CARDIAC REHAB | Age: 82
Discharge: HOME | End: 2017-12-29
Admitting: INTERNAL MEDICINE

## 2017-12-28 VITALS — BODY MASS INDEX: 29.51 KG/M2 | WEIGHT: 151.1 LBS

## 2018-01-01 ENCOUNTER — HOSPITAL ENCOUNTER (OUTPATIENT)
Dept: OTHER | Age: 83
Discharge: HOME | End: 2018-01-31
Attending: INTERNAL MEDICINE | Admitting: INTERNAL MEDICINE

## 2018-01-11 ENCOUNTER — HOSPITAL ENCOUNTER (OUTPATIENT)
Dept: CARDIAC REHAB | Age: 83
Discharge: HOME | End: 2018-01-12
Admitting: INTERNAL MEDICINE

## 2018-01-11 VITALS — WEIGHT: 146.2 LBS | BODY MASS INDEX: 28.55 KG/M2

## 2018-01-18 ENCOUNTER — HOSPITAL ENCOUNTER (OUTPATIENT)
Dept: CARDIAC REHAB | Age: 83
Discharge: HOME | End: 2018-01-19
Admitting: INTERNAL MEDICINE

## 2018-01-18 VITALS — BODY MASS INDEX: 28.94 KG/M2 | WEIGHT: 148.2 LBS

## 2018-01-25 ENCOUNTER — HOSPITAL ENCOUNTER (OUTPATIENT)
Dept: CARDIAC REHAB | Age: 83
Discharge: HOME | End: 2018-01-26
Admitting: INTERNAL MEDICINE

## 2018-01-25 VITALS — BODY MASS INDEX: 29 KG/M2 | WEIGHT: 148.5 LBS

## 2018-01-25 NOTE — PROGRESS NOTES
COPD EDUCATION by COPD CLINICAL EDUCATOR  1/25/2018 at 7:27 AM by Alexsandra Haywood     Patient reviewed by COPD education team. Patient does not qualify for COPD program.   Committees with [employer], enjoys spending time with family,                                     reading. Social/Spiritual: Mandaeism        Social History     Social History    Marital status:      Spouse name: N/A    Number of children: N/A    Years of education: N/A     Social History Main Topics    Smoking status: Former Smoker     Types: Cigarettes    Smokeless tobacco: Never Used      Comment: quit 45 years ago - socially    Alcohol use Yes      Comment: socially    Drug use: No    Sexual activity: Not on file     Other Topics Concern    Not on file     Social History Narrative    No narrative on file          Do you live alone: [x]  Alone []  with spouse/family       Do you have stairs in your home  [x]  no []  yes        Comment:       Transportation  [x]  drive self []  family/friend     Comment:       Cultural/Spiritual Influences: Mandaeism       Any Cultural or Bahai practices we should be aware of? No                    Fall Risk Assessment:     []  Cognitive Impairment []  Balance/Gait Disturbance   []  Fall History   []  Visual Difficulty   []  Dizziness   []  Other Comments:    Physical Assessment:    Color: [x]  natural []  pale [] cyanotic []  flushed []  jaundice    Skin Integrity [x]  intact [] other:    Heart Rate 56 Resp Rate 18     Breath Sounds: Clear/diminshed    Blood Pressures Sitting: Rt arm 173/99  Left arm: 180/90       Standing Rt arm 179/98  Left arm: 175/93    Resting SpO2: 95  Resp effort/pattern: Easy/Regular      Peripheral pulses: Yes    Edema:  No    Numbness/tingling:  No    Orthopedic/exercise limitations:  No      Psychosocial assessment:    Support System: Family and friends    Physical/Behavioral signs of abuse/neglect: No    Advance Directives:  Yes  [] info given    Learning Preferences: Primary Language:English        Preferred method of learning []  video []  handouts        [] listening/lecture   [x]  all    Memory impairment? No     EXERCISE  Initial Assessment  Day 1 EXERCISE  Intervention  Day 2-30 EXERCISE  Re-Assessment  Day 31-60 EXERCISE  Re-Assessment  Day 61-90+ EXERCISE  Last Day   Date:   11/21/17 Date:  Date:  Date:  Date:           Pre Rehab  6 MWT  1010 ft = 99% pred (normal)  2.4 METs  SpO2: 89%    1.9  MPH   HR: 129bpm      RPD: 5, RPE: 2                                           Post Rehab   6 MWT  ft = % pred   METs  SpO2: %  MPH  HR:  bpm      RPD:  , RPE:                                        GOALS  List at least 2 patient specific goals    1. Would like to be able to take a walk in woods outside of home without getting so short of breath. 2. Would like to gain some strength. Learning Barrier(s)  [] Speech           [] Literacy              [] Hearing          [] Cognitive            [] Vision             [x]  Ready to Learn          Balance Issues  [] Y  [x] N            Orthopedic Issues  []  Y[x]  N        Rate your Physical   Fitness (PF)?    3-4/10    Handgrip:  Right: 7  Left: 6    EDUCATION  [x]  Staff Introduction  []  Proper setup/O2 use  [x]  Equipment [place]  [x]  RPD/RPE Explain  [x]  SpO2/HR Explain  [x]  Breathing Retraining  [x]  Explain Intensity  [x] Initial Levels set GOALS                                        If Patient has a Learning Barrier, action:                   If pt has balance or orthopedic issues:  Interventions:                    Rate your physical   fitness (PF)?:   /10        -30 day PF goal:  /10    EDUCATION  [] Understands proper set/up O2 use  []  Understands SpO2 and RPD? [] Aerobic progression explained? []  Intensity progression explained?           GOALS                                                                                           Rate your physical   fitness (PF)?:   /10        -30 day PF goal:  /10    EDUCATION   []  Home Activity education offered  [] Stretching/ Flexibility education offered  [] Attended Benefits of 8oz.  [] Eat less sweets      Reasonable, achievable 30 day weight goal:  lbs   (1-2 lb per week is recommended)            Weight Gain        30 day   Target Goal:    [] increase proteins  [] add nutrition  Supplement  [] 6 small meals      Did pt meet Initial 30 day Target Goal(s)?:     New 30 DAY TARGET GOAL:    [] Decrease saturated fats  [] Decrease gas producing  cruciferous veggies   -  Reasonable, achievable 30 day weight goal:     Did pt meet previous 30 day Target   Goal(s)?:     New 30 DAY TARGET GOAL:    [] Switch to whole grains whenever possible  [] Decrease/ Eliminate carbonated beverages    Reasonable, achievable 30 day weight goal:    Did pt meet previous 30 day Target   Goal(s)?:      New 30 DAY TARGET GOAL:    [] Smaller meals more frequently  [] Decrease portion sizes by 25%      Reasonable, achievable 30 day weight goal:                  Did pt meet previous 30 day Target   Goal(s)?:                         Alexa's INDIVIDUAL TREATMENT PLAN  PSYCHOSOCIAL DOMAIN:    Psychosocial   Initial Assessment  Day 1 Psychosocial   Intervention  Day 2-30 Psychosocial  Re-Assessment  Day 31-60 Psychosocial   Re-Assessment  Day 61-90+ Psychosocial Discharge  Final Day   Psychosocial Screening Tools    (X) PHQ-9 score:       (X) SF-36:        (X) UCSD SOB score:          PHQ-9 Score: If score >or =15, Action:     SF-36 Mental Score:     UCSD Score: Any action on Screening Tools?:                  Psychosocial Screening   Tools    Repeat PHQ-9 Score if kin:    Repeat SF-36      Repeat UCSD SOB Score:       Assessment  []  S/S of depression identified?     []  PCP notified of PHQ-9 results      [x]  On Anti-anxiety / anti-depression meds?: Lexapro 5mg daily, Xanax 0.25mg prn         Intervention  If S/S of depression present, action taken:     Is the patient receiving support for the HI program at home?:  []  Y    [] N    Is the patient tolerating the intensity and duration of the HI program?: [] Y   [] N Re-Assessment  [] S/S of depression present? If [x] , action:         Med Changes?:   [] Y   [] N        Is the patient tolerating the intensity and duration of the NM program?:    []  Y    [] N  Re-Assessment  Psych Issue notes:        Is the patient receiving support for the NM program at home?:  []  Y  [] N    Is the patient tolerating the intensity and duration of the NM program?:  []  Y   []  N     Final Assessment                       Results of any Physician/ Counselor Intervention?:     [] Y   [] N         GOALS        30 DAY TARGET GOAL    [x]  Assess Mental Score using   SF-36 and PHQ-9   [x]  Teach PLB  [x]  Reassure pt that (s)he can stop and rest, adjust the speeds, and/or switch modalities from our suggestions  -    (0 being 'None', 10 being 'Very'),  -How would you rate your Anxiety Level: 5/10      -How would you rate your level of  depression: 5/10    -How would you rate your mood: (0 is negative all the time, 10 is positive all the time):  8          ADL's  Assess PF score on SF-36    Score=     [facility] ADL pt struggles with most: Laundry    Out of 10, rate your ability to do that ADL now.   7/8     GOALS        Did pt meet Initial 30 day   Target Goal(s)?:       30 DAY NEW TARGET GOAL    [] Decrease/Maintain anxiety and depression level  [] Increase ability to complete ADL  [] Encourage pt to rate exercises truthfully to encourage correct intensity / duration prescription  -  (0 being 'None', 10 being 'Very'),  -How would you rate your Anxiety Level:      -How would you rate your level of depression:       -How would you rate your mood: GOALS        Did pt meet previous 30 day Target Goal(s)?:      RE- ASSESSMENT GOAL    [] Decrease/  Maintain anxiety and depression level  [] Increase ability to complete ADL  -          (0 being 'None', 10 being 'Very'),  -How would you rate your Anxiety Level:      -How would you rate your level of depression:    -How would you rate your mood: GOALS        Did pt meet previous 30 day Target Goal(s)?:      RE- ASSESSMENT GOAL    [] Decrease/  Maintain anxiety and depression level  [] Increase ability to complete ADL  -          (0 being 'None', 10 being 'Very'),  -How would you rate your Anxiety Level:      -How would you rate your level of depression:    -How would you rate your mood:     Discharge            Did pts SF-36 Mental Score increase?:          [] Pt is aware of the s/s of depression    [] Received Psychosocial Education    Any follow up with physicians  necessary?:      [] Y    [] N               Alexa's INDIVIDUAL TREATMENT PLAN  EDUCATION DOMAIN:    EDUCATION   Initial Assessment  Day 1 EDUCATION   Intervention  Day 2-30 EDUCATION   Re-Assessment  Day 31-60 EDUCATION   ReAssessment Day 61-90+ EDUCATION Discharge  Final Day                      Education  [x] Equipment/Staff Orientation  [x] Breathing Retraining  [x] Importance of Clean Hands    Chronic Cough?:   [x] Y   []  N  Spacer?:   [x]   Y   []  N    Sleep Apnea?:  [] Y    [x] N     [x] Education Book given       Education  Individual instruction  [] PLB  [] RPD/RPE   [] O2 use  []  review PFT  [] Inhalers   []Home Activity/Warm up/ stretches  Attend Classes:  [] Nutrition  [] Panic control, relaxation, managing depression  [] A&P of the Respiratory System   [] Environ. Issues+ Travel   [] Bronchial Hygiene / Preventing Infection  [] Resistance Training  []  Benefits of Exercise  [] Energy Cons        Education  Individual instruction  [] PLB  [] RPD/RPE   [] O2 use  []  review PFT  [] Inhalers   [] Home Activity/Warm up/ stretches  Attend Classes:  [] Nutrition  []  Panic conntrol, relaxation, managing depression  []  A&P of the Respiratory System   [] Environ. Issues+ Travel   [] Bronchial Hygiene / Preventing Infection  []  Resistance Training  [] Benefits of Exercise  [] Energy Cons    Education  Individual instruction  [] PLB  [] RPD/RPE   []  O2 use  []  review PFT  [] Inhalers

## 2018-01-30 ENCOUNTER — HOSPITAL ENCOUNTER (OUTPATIENT)
Dept: CARDIAC REHAB | Age: 83
Discharge: HOME | End: 2018-01-31
Admitting: INTERNAL MEDICINE

## 2018-01-30 VITALS — WEIGHT: 148.9 LBS | BODY MASS INDEX: 29.08 KG/M2

## 2018-02-01 ENCOUNTER — HOSPITAL ENCOUNTER (OUTPATIENT)
Dept: OTHER | Age: 83
Discharge: HOME | End: 2018-02-28
Attending: INTERNAL MEDICINE | Admitting: INTERNAL MEDICINE

## 2018-02-01 ENCOUNTER — HOSPITAL ENCOUNTER (OUTPATIENT)
Dept: CARDIAC REHAB | Age: 83
Discharge: HOME | End: 2018-02-02
Admitting: INTERNAL MEDICINE

## 2018-02-01 VITALS — WEIGHT: 147.3 LBS | BODY MASS INDEX: 28.77 KG/M2

## 2018-02-13 ENCOUNTER — HOSPITAL ENCOUNTER (OUTPATIENT)
Dept: CARDIAC REHAB | Age: 83
Discharge: HOME | End: 2018-02-14
Admitting: INTERNAL MEDICINE

## 2018-02-13 VITALS — WEIGHT: 145.9 LBS | BODY MASS INDEX: 28.49 KG/M2

## 2018-02-13 NOTE — PROGRESS NOTES
Pt attended education class on environmental control and traveling with lung disease. Increased workload on arm erg.  Session time= 8:45-10:20am

## 2018-02-15 ENCOUNTER — HOSPITAL ENCOUNTER (OUTPATIENT)
Dept: CARDIAC REHAB | Age: 83
Discharge: HOME | End: 2018-02-16
Admitting: INTERNAL MEDICINE

## 2018-02-15 VITALS — BODY MASS INDEX: 29 KG/M2 | WEIGHT: 148.5 LBS

## 2018-02-20 ENCOUNTER — HOSPITAL ENCOUNTER (OUTPATIENT)
Dept: CARDIAC REHAB | Age: 83
Discharge: HOME | End: 2018-02-21
Admitting: INTERNAL MEDICINE

## 2018-02-20 VITALS — WEIGHT: 149.3 LBS | BODY MASS INDEX: 29.16 KG/M2

## 2018-02-22 ENCOUNTER — HOSPITAL ENCOUNTER (OUTPATIENT)
Dept: CARDIAC REHAB | Age: 83
Discharge: HOME | End: 2018-02-23
Admitting: INTERNAL MEDICINE

## 2018-02-22 VITALS — BODY MASS INDEX: 28.9 KG/M2 | WEIGHT: 148 LBS

## 2018-02-27 ENCOUNTER — HOSPITAL ENCOUNTER (OUTPATIENT)
Dept: CARDIAC REHAB | Age: 83
Discharge: HOME | End: 2018-02-28
Admitting: INTERNAL MEDICINE

## 2018-02-27 VITALS — WEIGHT: 148.5 LBS | BODY MASS INDEX: 29 KG/M2

## 2018-03-01 ENCOUNTER — HOSPITAL ENCOUNTER (OUTPATIENT)
Dept: OTHER | Age: 83
Discharge: HOME | End: 2018-03-31
Attending: INTERNAL MEDICINE | Admitting: INTERNAL MEDICINE

## 2018-03-01 ENCOUNTER — HOSPITAL ENCOUNTER (OUTPATIENT)
Dept: CARDIAC REHAB | Age: 83
Discharge: HOME | End: 2018-03-02
Admitting: INTERNAL MEDICINE

## 2018-03-01 VITALS — BODY MASS INDEX: 28.79 KG/M2 | WEIGHT: 147.4 LBS

## 2018-03-06 ENCOUNTER — HOSPITAL ENCOUNTER (OUTPATIENT)
Dept: CARDIAC REHAB | Age: 83
Discharge: HOME | End: 2018-03-07
Admitting: INTERNAL MEDICINE

## 2018-03-06 VITALS — WEIGHT: 147.4 LBS | BODY MASS INDEX: 28.79 KG/M2

## 2018-03-08 ENCOUNTER — HOSPITAL ENCOUNTER (OUTPATIENT)
Dept: CARDIAC REHAB | Age: 83
Discharge: HOME | End: 2018-03-09
Admitting: INTERNAL MEDICINE

## 2018-03-08 VITALS — BODY MASS INDEX: 28.96 KG/M2 | WEIGHT: 148.3 LBS

## 2018-03-15 ENCOUNTER — HOSPITAL ENCOUNTER (OUTPATIENT)
Dept: CARDIAC REHAB | Age: 83
Discharge: HOME | End: 2018-03-16
Admitting: INTERNAL MEDICINE

## 2018-03-15 VITALS — WEIGHT: 148 LBS | BODY MASS INDEX: 28.9 KG/M2

## 2018-03-22 ENCOUNTER — HOSPITAL ENCOUNTER (OUTPATIENT)
Dept: CARDIAC REHAB | Age: 83
Discharge: HOME | End: 2018-03-23
Admitting: INTERNAL MEDICINE

## 2018-03-29 ENCOUNTER — HOSPITAL ENCOUNTER (OUTPATIENT)
Dept: CARDIAC REHAB | Age: 83
Discharge: HOME | End: 2018-03-30
Admitting: INTERNAL MEDICINE

## 2018-03-29 VITALS — WEIGHT: 148 LBS | BODY MASS INDEX: 28.9 KG/M2

## 2018-04-01 ENCOUNTER — HOSPITAL ENCOUNTER (OUTPATIENT)
Dept: OTHER | Age: 83
Discharge: HOME | End: 2018-04-30
Attending: INTERNAL MEDICINE | Admitting: INTERNAL MEDICINE

## 2018-04-03 ENCOUNTER — HOSPITAL ENCOUNTER (OUTPATIENT)
Dept: CARDIAC REHAB | Age: 83
Discharge: HOME | End: 2018-04-04
Admitting: INTERNAL MEDICINE

## 2018-04-03 VITALS — WEIGHT: 148 LBS | BODY MASS INDEX: 28.9 KG/M2

## 2018-05-01 ENCOUNTER — HOSPITAL ENCOUNTER (OUTPATIENT)
Dept: OTHER | Age: 83
Discharge: HOME | End: 2018-05-31
Attending: INTERNAL MEDICINE | Admitting: INTERNAL MEDICINE

## 2018-05-03 ENCOUNTER — OFFICE VISIT (OUTPATIENT)
Dept: PULMONOLOGY | Age: 83
End: 2018-05-03

## 2018-05-03 VITALS
OXYGEN SATURATION: 93 % | WEIGHT: 149 LBS | RESPIRATION RATE: 16 BRPM | TEMPERATURE: 98.3 F | HEART RATE: 76 BPM | DIASTOLIC BLOOD PRESSURE: 80 MMHG | BODY MASS INDEX: 29.25 KG/M2 | HEIGHT: 60 IN | SYSTOLIC BLOOD PRESSURE: 136 MMHG

## 2018-05-03 DIAGNOSIS — J44.9 CHRONIC BRONCHITIS WITH COPD (CHRONIC OBSTRUCTIVE PULMONARY DISEASE) (HCC): Primary | ICD-10-CM

## 2018-05-03 DIAGNOSIS — J31.0 CHRONIC RHINITIS, UNSPECIFIED TYPE: ICD-10-CM

## 2018-05-03 PROCEDURE — 3023F SPIROM DOC REV: CPT | Performed by: INTERNAL MEDICINE

## 2018-05-03 PROCEDURE — 1090F PRES/ABSN URINE INCON ASSESS: CPT | Performed by: INTERNAL MEDICINE

## 2018-05-03 PROCEDURE — 99213 OFFICE O/P EST LOW 20 MIN: CPT | Performed by: INTERNAL MEDICINE

## 2018-05-03 PROCEDURE — G8926 SPIRO NO PERF OR DOC: HCPCS | Performed by: INTERNAL MEDICINE

## 2018-05-03 PROCEDURE — G8417 CALC BMI ABV UP PARAM F/U: HCPCS | Performed by: INTERNAL MEDICINE

## 2018-05-03 PROCEDURE — G8399 PT W/DXA RESULTS DOCUMENT: HCPCS | Performed by: INTERNAL MEDICINE

## 2018-05-03 PROCEDURE — G8427 DOCREV CUR MEDS BY ELIG CLIN: HCPCS | Performed by: INTERNAL MEDICINE

## 2018-05-03 PROCEDURE — 4040F PNEUMOC VAC/ADMIN/RCVD: CPT | Performed by: INTERNAL MEDICINE

## 2018-05-03 PROCEDURE — 1123F ACP DISCUSS/DSCN MKR DOCD: CPT | Performed by: INTERNAL MEDICINE

## 2018-05-03 PROCEDURE — 1036F TOBACCO NON-USER: CPT | Performed by: INTERNAL MEDICINE

## 2018-05-03 RX ORDER — ALBUTEROL SULFATE 90 UG/1
2 AEROSOL, METERED RESPIRATORY (INHALATION) EVERY 6 HOURS PRN
Qty: 1 INHALER | Refills: 5 | Status: SHIPPED | OUTPATIENT
Start: 2018-05-03 | End: 2018-09-12 | Stop reason: SDUPTHER

## 2018-05-14 ENCOUNTER — TELEPHONE (OUTPATIENT)
Dept: PULMONOLOGY | Age: 83
End: 2018-05-14

## 2018-06-13 ENCOUNTER — TELEPHONE (OUTPATIENT)
Dept: PULMONOLOGY | Age: 83
End: 2018-06-13

## 2018-06-20 ENCOUNTER — OFFICE VISIT (OUTPATIENT)
Dept: PULMONOLOGY | Age: 83
End: 2018-06-20

## 2018-06-20 VITALS
RESPIRATION RATE: 16 BRPM | SYSTOLIC BLOOD PRESSURE: 134 MMHG | DIASTOLIC BLOOD PRESSURE: 62 MMHG | TEMPERATURE: 97.7 F | WEIGHT: 152 LBS | HEIGHT: 60 IN | OXYGEN SATURATION: 94 % | BODY MASS INDEX: 29.84 KG/M2 | HEART RATE: 70 BPM

## 2018-06-20 DIAGNOSIS — J44.9 COPD, MILD (HCC): Primary | ICD-10-CM

## 2018-06-20 DIAGNOSIS — I50.32 DIASTOLIC DYSFUNCTION WITH CHRONIC HEART FAILURE (HCC): ICD-10-CM

## 2018-06-20 PROCEDURE — 1123F ACP DISCUSS/DSCN MKR DOCD: CPT | Performed by: INTERNAL MEDICINE

## 2018-06-20 PROCEDURE — G8417 CALC BMI ABV UP PARAM F/U: HCPCS | Performed by: INTERNAL MEDICINE

## 2018-06-20 PROCEDURE — G8427 DOCREV CUR MEDS BY ELIG CLIN: HCPCS | Performed by: INTERNAL MEDICINE

## 2018-06-20 PROCEDURE — 1036F TOBACCO NON-USER: CPT | Performed by: INTERNAL MEDICINE

## 2018-06-20 PROCEDURE — 3023F SPIROM DOC REV: CPT | Performed by: INTERNAL MEDICINE

## 2018-06-20 PROCEDURE — 99213 OFFICE O/P EST LOW 20 MIN: CPT | Performed by: INTERNAL MEDICINE

## 2018-06-20 PROCEDURE — G8926 SPIRO NO PERF OR DOC: HCPCS | Performed by: INTERNAL MEDICINE

## 2018-06-20 PROCEDURE — 1090F PRES/ABSN URINE INCON ASSESS: CPT | Performed by: INTERNAL MEDICINE

## 2018-06-20 PROCEDURE — 4040F PNEUMOC VAC/ADMIN/RCVD: CPT | Performed by: INTERNAL MEDICINE

## 2018-06-20 PROCEDURE — G8399 PT W/DXA RESULTS DOCUMENT: HCPCS | Performed by: INTERNAL MEDICINE

## 2018-06-20 RX ORDER — PREDNISONE 20 MG/1
40 TABLET ORAL DAILY
Qty: 10 TABLET | Refills: 0 | Status: SHIPPED | OUTPATIENT
Start: 2018-06-20 | End: 2018-06-25

## 2018-08-07 ENCOUNTER — TELEPHONE (OUTPATIENT)
Dept: PULMONOLOGY | Age: 83
End: 2018-08-07

## 2018-08-07 RX ORDER — PREDNISONE 20 MG/1
40 TABLET ORAL DAILY
Qty: 10 TABLET | Refills: 0 | Status: SHIPPED | OUTPATIENT
Start: 2018-08-07 | End: 2018-08-12

## 2018-08-07 RX ORDER — DEXAMETHASONE 4 MG/1
2 TABLET ORAL 2 TIMES DAILY
Qty: 1 INHALER | Refills: 5 | Status: SHIPPED | OUTPATIENT
Start: 2018-08-07 | End: 2019-01-16 | Stop reason: SDUPTHER

## 2018-08-07 NOTE — TELEPHONE ENCOUNTER
Pt is having difficulty breathing. She has used her nebulizer 6 times since last night (6:30pm / 8:30pm /10:30 pm / 2:00 am / 7:00 am / 10:30 am). She has used her rescue inhaler twice this morning.    Please Advise

## 2018-08-07 NOTE — TELEPHONE ENCOUNTER
Flovent refilled. Sent in Prednisone 40 x 5 days    How does she keep getting Spiriva. .  We have discussed this on phone notes. Last office note stated she was on Flovent and Anoro. There was  A phone note in may about her being on Anoro and Spiriva . This is double [facility] and should not have both.

## 2018-09-12 DIAGNOSIS — J44.9 CHRONIC BRONCHITIS WITH COPD (CHRONIC OBSTRUCTIVE PULMONARY DISEASE) (HCC): ICD-10-CM

## 2018-09-12 RX ORDER — ALBUTEROL SULFATE 90 UG/1
2 AEROSOL, METERED RESPIRATORY (INHALATION) EVERY 6 HOURS PRN
Qty: 1 INHALER | Refills: 3 | Status: SHIPPED | OUTPATIENT
Start: 2018-09-12 | End: 2018-12-09 | Stop reason: SDUPTHER

## 2018-09-28 DIAGNOSIS — J44.9 OBSTRUCTIVE CHRONIC BRONCHITIS WITHOUT EXACERBATION (HCC): ICD-10-CM

## 2018-09-28 RX ORDER — ALBUTEROL SULFATE 2.5 MG/3ML
SOLUTION RESPIRATORY (INHALATION)
Qty: 1620 ML | Refills: 3 | Status: SHIPPED | OUTPATIENT
Start: 2018-09-28 | End: 2020-04-14 | Stop reason: SDUPTHER

## 2018-10-23 ENCOUNTER — OFFICE VISIT (OUTPATIENT)
Dept: PULMONOLOGY | Age: 83
End: 2018-10-23
Payer: MEDICARE

## 2018-10-23 VITALS
SYSTOLIC BLOOD PRESSURE: 136 MMHG | RESPIRATION RATE: 16 BRPM | WEIGHT: 147 LBS | BODY MASS INDEX: 28.86 KG/M2 | HEIGHT: 60 IN | HEART RATE: 60 BPM | OXYGEN SATURATION: 93 % | TEMPERATURE: 97.4 F | DIASTOLIC BLOOD PRESSURE: 72 MMHG

## 2018-10-23 DIAGNOSIS — J44.9 COPD, MILD (HCC): Primary | ICD-10-CM

## 2018-10-23 PROCEDURE — 1101F PT FALLS ASSESS-DOCD LE1/YR: CPT | Performed by: INTERNAL MEDICINE

## 2018-10-23 PROCEDURE — 1036F TOBACCO NON-USER: CPT | Performed by: INTERNAL MEDICINE

## 2018-10-23 PROCEDURE — 3023F SPIROM DOC REV: CPT | Performed by: INTERNAL MEDICINE

## 2018-10-23 PROCEDURE — G8926 SPIRO NO PERF OR DOC: HCPCS | Performed by: INTERNAL MEDICINE

## 2018-10-23 PROCEDURE — G8427 DOCREV CUR MEDS BY ELIG CLIN: HCPCS | Performed by: INTERNAL MEDICINE

## 2018-10-23 PROCEDURE — 99213 OFFICE O/P EST LOW 20 MIN: CPT | Performed by: INTERNAL MEDICINE

## 2018-10-23 PROCEDURE — 4040F PNEUMOC VAC/ADMIN/RCVD: CPT | Performed by: INTERNAL MEDICINE

## 2018-10-23 PROCEDURE — G8417 CALC BMI ABV UP PARAM F/U: HCPCS | Performed by: INTERNAL MEDICINE

## 2018-10-23 PROCEDURE — 1123F ACP DISCUSS/DSCN MKR DOCD: CPT | Performed by: INTERNAL MEDICINE

## 2018-10-23 PROCEDURE — G8482 FLU IMMUNIZE ORDER/ADMIN: HCPCS | Performed by: INTERNAL MEDICINE

## 2018-10-23 PROCEDURE — 1090F PRES/ABSN URINE INCON ASSESS: CPT | Performed by: INTERNAL MEDICINE

## 2018-10-23 PROCEDURE — G8399 PT W/DXA RESULTS DOCUMENT: HCPCS | Performed by: INTERNAL MEDICINE

## 2018-10-23 RX ORDER — IRBESARTAN 300 MG/1
300 TABLET ORAL NIGHTLY
COMMUNITY

## 2018-10-23 NOTE — ASSESSMENT & PLAN NOTE
Controlled symptoms with Flovent and Anoro  Does not wish to change medications. We discussed making seizure with Trelegy and with Arnuity.

## 2018-12-09 DIAGNOSIS — J44.9 CHRONIC BRONCHITIS WITH COPD (CHRONIC OBSTRUCTIVE PULMONARY DISEASE) (HCC): ICD-10-CM

## 2019-01-02 DIAGNOSIS — J44.9 CHRONIC BRONCHITIS WITH COPD (CHRONIC OBSTRUCTIVE PULMONARY DISEASE) (HCC): ICD-10-CM

## 2019-01-16 RX ORDER — DEXAMETHASONE 4 MG/1
2 TABLET ORAL 2 TIMES DAILY
Qty: 1 INHALER | Refills: 5 | Status: SHIPPED | OUTPATIENT
Start: 2019-01-16 | End: 2019-12-02 | Stop reason: ALTCHOICE

## 2019-01-25 DIAGNOSIS — J44.9 CHRONIC BRONCHITIS WITH COPD (CHRONIC OBSTRUCTIVE PULMONARY DISEASE) (HCC): ICD-10-CM

## 2019-02-17 DIAGNOSIS — J44.9 CHRONIC BRONCHITIS WITH COPD (CHRONIC OBSTRUCTIVE PULMONARY DISEASE) (HCC): ICD-10-CM

## 2019-02-18 RX ORDER — ALBUTEROL SULFATE 90 UG/1
2 AEROSOL, METERED RESPIRATORY (INHALATION) EVERY 6 HOURS PRN
Qty: 1 INHALER | Refills: 5 | Status: SHIPPED | OUTPATIENT
Start: 2019-02-18 | End: 2019-07-01

## 2019-04-30 RX ORDER — UMECLIDINIUM BROMIDE AND VILANTEROL TRIFENATATE 62.5; 25 UG/1; UG/1
POWDER RESPIRATORY (INHALATION)
Qty: 1 EACH | Refills: 0 | Status: SHIPPED | OUTPATIENT
Start: 2019-04-30 | End: 2019-12-02 | Stop reason: ALTCHOICE

## 2019-05-09 ENCOUNTER — OFFICE VISIT (OUTPATIENT)
Dept: PULMONOLOGY | Age: 84
End: 2019-05-09
Payer: MEDICARE

## 2019-05-09 VITALS
WEIGHT: 147 LBS | SYSTOLIC BLOOD PRESSURE: 128 MMHG | TEMPERATURE: 97.8 F | HEART RATE: 58 BPM | RESPIRATION RATE: 16 BRPM | OXYGEN SATURATION: 93 % | DIASTOLIC BLOOD PRESSURE: 70 MMHG | HEIGHT: 60 IN | BODY MASS INDEX: 28.86 KG/M2

## 2019-05-09 DIAGNOSIS — J44.9 COPD, MILD (HCC): Primary | ICD-10-CM

## 2019-05-09 PROCEDURE — 1090F PRES/ABSN URINE INCON ASSESS: CPT | Performed by: INTERNAL MEDICINE

## 2019-05-09 PROCEDURE — 99213 OFFICE O/P EST LOW 20 MIN: CPT | Performed by: INTERNAL MEDICINE

## 2019-05-09 PROCEDURE — 1123F ACP DISCUSS/DSCN MKR DOCD: CPT | Performed by: INTERNAL MEDICINE

## 2019-05-09 PROCEDURE — 1036F TOBACCO NON-USER: CPT | Performed by: INTERNAL MEDICINE

## 2019-05-09 PROCEDURE — G8417 CALC BMI ABV UP PARAM F/U: HCPCS | Performed by: INTERNAL MEDICINE

## 2019-05-09 PROCEDURE — G8427 DOCREV CUR MEDS BY ELIG CLIN: HCPCS | Performed by: INTERNAL MEDICINE

## 2019-05-09 PROCEDURE — 4040F PNEUMOC VAC/ADMIN/RCVD: CPT | Performed by: INTERNAL MEDICINE

## 2019-05-09 PROCEDURE — 3023F SPIROM DOC REV: CPT | Performed by: INTERNAL MEDICINE

## 2019-05-09 PROCEDURE — G8926 SPIRO NO PERF OR DOC: HCPCS | Performed by: INTERNAL MEDICINE

## 2019-05-09 RX ORDER — SERTRALINE HYDROCHLORIDE 25 MG/1
75 TABLET, FILM COATED ORAL DAILY
COMMUNITY

## 2019-05-09 NOTE — PROGRESS NOTES
REASON FOR CONSULTATION/CC: copd vs asthma      Consult at request of  Cherri Rudd MD for shortness of breath     PCP: Cherri Rudd MD    HISTORY OF PRESENT ILLNESS: Elba Plunkett is a 80y.o. year old female with a history of asthma/copd who presents :        COPD  Walking more . Exercise 3 times per week. Anoro continues to work well. Flonase  Bid. Nebulizer 2-3 times per day. PAST MEDICAL HISTORY:  Past Medical History:   Diagnosis Date    CAD (coronary artery disease)     Hypertension        PAST SURGICAL HISTORY:  Past Surgical History:   Procedure Laterality Date    CORONARY ANGIOPLASTY WITH STENT PLACEMENT      1998       FAMILY HISTORY:  family history includes COPD in her mother. SOCIAL HISTORY:   reports that she has quit smoking. Her smoking use included cigarettes. She has never used smokeless tobacco.      ALLERGIES:  Patient has No Known Allergies. REVIEW OF SYSTEMS:  Constitutional: Negative for fever    HENT: Negative for sore throat  Eyes: Negative for redness   Respiratory: controlled dyspnea back to baseline, - cough  Skin: Negative for rash  Neurological: Negative for syncope  Hematological: Negative for adenopathy  Psychiatric/Behavorial: Negative for anxiety    Objective:   PHYSICAL EXAM:  Blood pressure 128/70, pulse 58, temperature 97.8 °F (36.6 °C), temperature source Oral, resp. rate 16, height 5' (1.524 m), weight 147 lb (66.7 kg), SpO2 93 %, not currently breastfeeding.'  Gen: No distress. ENT: External appearance with saddle nose deformity,   Resp: No accessory muscle use. No crackles. No wheezes. No rhonchi. CV: Regular rate. Regular rhythm. No murmur or rub. No edema. Skin: Warm, dry, normal texture and turgor. No nodule on exposed extremities. M/S: No cyanosis. No clubbing. No joint deformity. Psych: Oriented x 3. No anxiety. Awake. Alert. Intact judgement and insight. Good Mood / Affect. Memory appears in tact     Current Outpatient Medications   Medication Sig Dispense Refill    tiotropium (SPIRIVA RESPIMAT) 2.5 MCG/ACT AERS inhaler Inhale 2 puffs into the lungs daily      sertraline (ZOLOFT) 25 MG tablet Take 25 mg by mouth daily      Fluticasone-Umeclidin-Vilant (TRELEGY ELLIPTA) 100-62.5-25 MCG/INH AEPB Inhale 1 puff into the lungs daily 1 each 5    ANORO ELLIPTA 62.5-25 MCG/INH AEPB inhaler INHALE 1 PUFF INTO THE LUNGS DAILY 1 each 0    albuterol sulfate HFA (PROAIR HFA) 108 (90 Base) MCG/ACT inhaler Inhale 2 puffs into the lungs every 6 hours as needed for Wheezing or Shortness of Breath 1 Inhaler 5    FLOVENT  MCG/ACT inhaler Inhale 2 puffs into the lungs 2 times daily 1 Inhaler 5    irbesartan (AVAPRO) 300 MG tablet Take 300 mg by mouth nightly      albuterol (PROVENTIL) (2.5 MG/3ML) 0.083% nebulizer solution USE 1 VIAL IN NEBULIZER FOUR TIMES DAILY 1620 mL 3    levothyroxine (SYNTHROID) 150 MCG tablet       isosorbide mononitrate (IMDUR) 30 MG extended release tablet       sotalol (BETAPACE) 80 MG tablet       alendronate (FOSAMAX) 70 MG tablet 70 mg once a week      ALPRAZolam (XANAX) 0.25 MG tablet 0.25 mg as needed      montelukast (SINGULAIR) 10 MG tablet 10 mg nightly      pravastatin (PRAVACHOL) 40 MG tablet 40 mg daily      ezetimibe (ZETIA) 10 MG tablet Take 10 mg by mouth daily      aspirin 325 MG tablet Take 325 mg by mouth daily      Ascorbic Acid (VITAMIN C) 1000 MG tablet Take 1,000 mg by mouth daily      Coenzyme Q10 (CO Q 10 PO) Take by mouth daily      Multiple Vitamins-Minerals (OCUVITE PO) Take by mouth daily      calcium carbonate (OSCAL) 500 MG TABS tablet Take 500 mg by mouth 2 times daily       No current facility-administered medications for this visit.         Data Reviewed:   CBC and Renal reviewed  Last CBC  No results found for: WBC, RBC, HGB, MCV, PLT  Last Renal  No results found for: NA, K, CL, CO2, BUN, CREATININE, GLUCOSE,

## 2019-05-09 NOTE — ASSESSMENT & PLAN NOTE
She is doing well with Anoro and Flovent. Will change to Trelegy after has completed Anoro and Flovent to make things easier.

## 2019-07-01 DIAGNOSIS — J44.9 CHRONIC BRONCHITIS WITH COPD (CHRONIC OBSTRUCTIVE PULMONARY DISEASE) (HCC): ICD-10-CM

## 2019-07-01 RX ORDER — ALBUTEROL SULFATE 90 UG/1
2 AEROSOL, METERED RESPIRATORY (INHALATION) EVERY 6 HOURS PRN
Qty: 1 INHALER | Refills: 5 | Status: SHIPPED | OUTPATIENT
Start: 2019-07-01 | End: 2019-11-11 | Stop reason: SDUPTHER

## 2019-10-20 DIAGNOSIS — J44.9 COPD, MILD (HCC): ICD-10-CM

## 2019-10-21 RX ORDER — FLUTICASONE FUROATE, UMECLIDINIUM BROMIDE AND VILANTEROL TRIFENATATE 100; 62.5; 25 UG/1; UG/1; UG/1
POWDER RESPIRATORY (INHALATION)
Qty: 1 EACH | Refills: 5 | Status: SHIPPED | OUTPATIENT
Start: 2019-10-21 | End: 2020-04-03

## 2019-11-11 DIAGNOSIS — J44.9 CHRONIC BRONCHITIS WITH COPD (CHRONIC OBSTRUCTIVE PULMONARY DISEASE) (HCC): ICD-10-CM

## 2019-11-11 RX ORDER — ALBUTEROL SULFATE 90 UG/1
2 AEROSOL, METERED RESPIRATORY (INHALATION) EVERY 6 HOURS PRN
Qty: 1 INHALER | Refills: 0 | Status: SHIPPED | OUTPATIENT
Start: 2019-11-11 | End: 2019-12-04 | Stop reason: SDUPTHER

## 2019-12-02 ENCOUNTER — OFFICE VISIT (OUTPATIENT)
Dept: PULMONOLOGY | Age: 84
End: 2019-12-02
Payer: COMMERCIAL

## 2019-12-02 VITALS
RESPIRATION RATE: 16 BRPM | OXYGEN SATURATION: 93 % | DIASTOLIC BLOOD PRESSURE: 68 MMHG | SYSTOLIC BLOOD PRESSURE: 130 MMHG | TEMPERATURE: 97.8 F | HEIGHT: 60 IN | WEIGHT: 147 LBS | HEART RATE: 54 BPM | BODY MASS INDEX: 28.86 KG/M2

## 2019-12-02 DIAGNOSIS — J44.9 COPD, MILD (HCC): Primary | ICD-10-CM

## 2019-12-02 PROCEDURE — 99213 OFFICE O/P EST LOW 20 MIN: CPT | Performed by: INTERNAL MEDICINE

## 2019-12-02 RX ORDER — NITROGLYCERIN 0.4 MG/1
0.4 TABLET SUBLINGUAL EVERY 5 MIN PRN
COMMUNITY

## 2019-12-02 RX ORDER — DONEPEZIL HYDROCHLORIDE 10 MG/1
10 TABLET, FILM COATED ORAL NIGHTLY
COMMUNITY

## 2019-12-02 RX ORDER — MEMANTINE HYDROCHLORIDE 10 MG/1
10 TABLET ORAL 2 TIMES DAILY
COMMUNITY

## 2019-12-04 DIAGNOSIS — J44.9 CHRONIC BRONCHITIS WITH COPD (CHRONIC OBSTRUCTIVE PULMONARY DISEASE) (HCC): ICD-10-CM

## 2019-12-04 RX ORDER — ALBUTEROL SULFATE 90 UG/1
2 AEROSOL, METERED RESPIRATORY (INHALATION) EVERY 6 HOURS PRN
Qty: 6.7 G | Refills: 0 | Status: SHIPPED | OUTPATIENT
Start: 2019-12-04 | End: 2019-12-26

## 2019-12-26 DIAGNOSIS — J44.9 CHRONIC BRONCHITIS WITH COPD (CHRONIC OBSTRUCTIVE PULMONARY DISEASE) (HCC): ICD-10-CM

## 2019-12-26 RX ORDER — ALBUTEROL SULFATE 90 UG/1
2 AEROSOL, METERED RESPIRATORY (INHALATION) EVERY 6 HOURS PRN
Qty: 6.7 G | Refills: 0 | Status: SHIPPED | OUTPATIENT
Start: 2019-12-26 | End: 2020-01-17

## 2020-01-17 RX ORDER — ALBUTEROL SULFATE 90 UG/1
2 AEROSOL, METERED RESPIRATORY (INHALATION) EVERY 6 HOURS PRN
Qty: 6.7 G | Refills: 0 | Status: SHIPPED | OUTPATIENT
Start: 2020-01-17 | End: 2020-02-10

## 2020-02-10 RX ORDER — ALBUTEROL SULFATE 90 UG/1
2 AEROSOL, METERED RESPIRATORY (INHALATION) EVERY 6 HOURS PRN
Qty: 6.7 G | Refills: 0 | Status: SHIPPED | OUTPATIENT
Start: 2020-02-10 | End: 2020-03-03

## 2020-03-03 RX ORDER — ALBUTEROL SULFATE 90 UG/1
2 AEROSOL, METERED RESPIRATORY (INHALATION) EVERY 6 HOURS PRN
Qty: 1 INHALER | Refills: 5 | Status: SHIPPED | OUTPATIENT
Start: 2020-03-03 | End: 2020-06-23 | Stop reason: SDUPTHER

## 2020-04-03 RX ORDER — FLUTICASONE FUROATE, UMECLIDINIUM BROMIDE AND VILANTEROL TRIFENATATE 100; 62.5; 25 UG/1; UG/1; UG/1
POWDER RESPIRATORY (INHALATION)
Qty: 60 EACH | Refills: 0 | Status: SHIPPED | OUTPATIENT
Start: 2020-04-03 | End: 2020-04-30

## 2020-04-14 NOTE — TELEPHONE ENCOUNTER
Received fax requesting refills on her albuterol neb solution be sent to [facility] in [facility]

## 2020-04-15 RX ORDER — ALBUTEROL SULFATE 2.5 MG/3ML
2.5 SOLUTION RESPIRATORY (INHALATION) 4 TIMES DAILY
Qty: 480 ML | Refills: 5 | Status: SHIPPED | OUTPATIENT
Start: 2020-04-15 | End: 2020-09-21 | Stop reason: SDUPTHER

## 2020-04-16 ENCOUNTER — TELEPHONE (OUTPATIENT)
Dept: PULMONOLOGY | Age: 85
End: 2020-04-16

## 2020-04-17 NOTE — TELEPHONE ENCOUNTER
Pt daughter called back asking is the case number for her mother's insurance due to her needing approval for her presciption.

## 2020-04-17 NOTE — TELEPHONE ENCOUNTER
Pharmacy was called and it went thru for $10 .    I spoke to the daughter and she said that she had just checked the website and not called the pharmacy

## 2020-04-30 RX ORDER — FLUTICASONE FUROATE, UMECLIDINIUM BROMIDE AND VILANTEROL TRIFENATATE 100; 62.5; 25 UG/1; UG/1; UG/1
POWDER RESPIRATORY (INHALATION)
Qty: 60 EACH | Refills: 0 | Status: SHIPPED | OUTPATIENT
Start: 2020-04-30 | End: 2020-05-28

## 2020-05-28 RX ORDER — FLUTICASONE FUROATE, UMECLIDINIUM BROMIDE AND VILANTEROL TRIFENATATE 100; 62.5; 25 UG/1; UG/1; UG/1
POWDER RESPIRATORY (INHALATION)
Qty: 60 EACH | Refills: 0 | Status: SHIPPED | OUTPATIENT
Start: 2020-05-28 | End: 2021-02-10 | Stop reason: SDUPTHER

## 2020-06-24 RX ORDER — ALBUTEROL SULFATE 90 UG/1
2 AEROSOL, METERED RESPIRATORY (INHALATION) EVERY 6 HOURS PRN
Qty: 1 INHALER | Refills: 3 | Status: SHIPPED | OUTPATIENT
Start: 2020-06-24 | End: 2020-09-28 | Stop reason: SDUPTHER

## 2020-08-03 ENCOUNTER — OFFICE VISIT (OUTPATIENT)
Dept: PULMONOLOGY | Age: 85
End: 2020-08-03
Payer: MEDICARE

## 2020-08-03 VITALS
HEART RATE: 50 BPM | SYSTOLIC BLOOD PRESSURE: 136 MMHG | WEIGHT: 138 LBS | HEIGHT: 60 IN | RESPIRATION RATE: 16 BRPM | DIASTOLIC BLOOD PRESSURE: 68 MMHG | BODY MASS INDEX: 27.09 KG/M2 | TEMPERATURE: 98.1 F | OXYGEN SATURATION: 91 %

## 2020-08-03 PROCEDURE — 99213 OFFICE O/P EST LOW 20 MIN: CPT | Performed by: INTERNAL MEDICINE

## 2020-08-03 RX ORDER — AMLODIPINE BESYLATE 5 MG/1
5 TABLET ORAL DAILY
COMMUNITY
End: 2022-05-16

## 2020-08-03 NOTE — PROGRESS NOTES
Sig Dispense Refill    amLODIPine (NORVASC) 5 MG tablet Take 5 mg by mouth daily      Cyanocobalamin (VITAMIN B12 PO) Take by mouth daily      albuterol sulfate  (90 Base) MCG/ACT inhaler Inhale 2 puffs into the lungs every 6 hours as needed for Wheezing or Shortness of Breath 1 Inhaler 3    TRELEGY ELLIPTA 100-62.5-25 MCG/INH AEPB INHALE 1 PUFF INTO THE LUNGS DAILY 60 each 0    albuterol (PROVENTIL) (2.5 MG/3ML) 0.083% nebulizer solution Take 3 mLs by nebulization 4 times daily 480 mL 5    donepezil (ARICEPT) 10 MG tablet Take 10 mg by mouth nightly      memantine (NAMENDA) 10 MG tablet Take 10 mg by mouth 2 times daily      nitroGLYCERIN (NITROSTAT) 0.4 MG SL tablet Place 0.4 mg under the tongue every 5 minutes as needed for Chest pain up to max of 3 total doses. If no relief after 1 dose, call 911.  sertraline (ZOLOFT) 25 MG tablet Take 25 mg by mouth daily      irbesartan (AVAPRO) 300 MG tablet Take 300 mg by mouth nightly      levothyroxine (SYNTHROID) 150 MCG tablet       isosorbide mononitrate (IMDUR) 30 MG extended release tablet       sotalol (BETAPACE) 80 MG tablet       alendronate (FOSAMAX) 70 MG tablet 70 mg once a week      ALPRAZolam (XANAX) 0.25 MG tablet 0.25 mg as needed      montelukast (SINGULAIR) 10 MG tablet 10 mg nightly      pravastatin (PRAVACHOL) 40 MG tablet 40 mg daily      ezetimibe (ZETIA) 10 MG tablet Take 10 mg by mouth daily      aspirin 325 MG tablet Take 325 mg by mouth daily      Ascorbic Acid (VITAMIN C) 1000 MG tablet Take 1,000 mg by mouth daily      Coenzyme Q10 (CO Q 10 PO) Take by mouth daily      Multiple Vitamins-Minerals (OCUVITE PO) Take by mouth daily      calcium carbonate (OSCAL) 500 MG TABS tablet Take 500 mg by mouth 2 times daily       No current facility-administered medications for this visit.         Data Reviewed:   CBC and Renal reviewed  Last CBC  No results found for: WBC, RBC, HGB, MCV, PLT  Last Renal  No results found for: NA, K, CL, CO2, BUN, CREATININE, GLUCOSE, CALCIUM    Last ABG  POC Blood Gas: No results found for: POCPH, POCPCO2, POCPO2, POCHCO3, NBEA, [id number]  No results for input(s): PH, PCO2, PO2, HCO3, BE, O2SAT in the last 72 hours. Chest imaging reports were reviewed and imaging was reviewed by me and showed   5.8. 17. Ct chest.  No emphysema. Expiratory phase of high-resolution CT suboptimal.    Assessment:       · Chronic bronchitis versus asthmatic bronchitis  · Chronic rhinitis    Plan:            Problem List Items Addressed This Visit     COPD, mild ([address]     Controlled with Trelegy. No change in therapy.

## 2020-09-21 RX ORDER — ALBUTEROL SULFATE 2.5 MG/3ML
2.5 SOLUTION RESPIRATORY (INHALATION) 4 TIMES DAILY
Qty: 480 ML | Refills: 5 | Status: SHIPPED | OUTPATIENT
Start: 2020-09-21 | End: 2022-09-16

## 2020-09-28 RX ORDER — ALBUTEROL SULFATE 90 UG/1
2 AEROSOL, METERED RESPIRATORY (INHALATION) EVERY 6 HOURS PRN
Qty: 1 INHALER | Refills: 5 | Status: SHIPPED | OUTPATIENT
Start: 2020-09-28 | End: 2022-05-13

## 2021-02-04 ENCOUNTER — OFFICE VISIT (OUTPATIENT)
Dept: PULMONOLOGY | Age: 86
End: 2021-02-04
Payer: COMMERCIAL

## 2021-02-04 VITALS
HEART RATE: 60 BPM | HEIGHT: 60 IN | RESPIRATION RATE: 16 BRPM | DIASTOLIC BLOOD PRESSURE: 60 MMHG | OXYGEN SATURATION: 91 % | BODY MASS INDEX: 29.45 KG/M2 | TEMPERATURE: 97.6 F | SYSTOLIC BLOOD PRESSURE: 98 MMHG | WEIGHT: 150 LBS

## 2021-02-04 DIAGNOSIS — J44.9 COPD, MILD (HCC): ICD-10-CM

## 2021-02-04 DIAGNOSIS — J30.89 OTHER ALLERGIC RHINITIS: ICD-10-CM

## 2021-02-04 PROCEDURE — 99214 OFFICE O/P EST MOD 30 MIN: CPT | Performed by: INTERNAL MEDICINE

## 2021-02-04 RX ORDER — ASPIRIN 81 MG/1
81 TABLET, CHEWABLE ORAL DAILY
COMMUNITY

## 2021-02-04 RX ORDER — FUROSEMIDE 40 MG/1
40 TABLET ORAL DAILY
Status: ON HOLD | COMMUNITY
End: 2022-09-18 | Stop reason: HOSPADM

## 2021-02-04 ASSESSMENT — COPD QUESTIONNAIRES
QUESTION4_WALKINCLINE: 4
QUESTION7_SLEEPQUALITY: 2
QUESTION6_LEAVINGHOUSE: 4
QUESTION8_ENERGYLEVEL: 2
QUESTION5_HOMEACTIVITIES: 4

## 2021-02-04 NOTE — PROGRESS NOTES
REASON FOR CONSULTATION/CC: copd vs asthma      Consult at request of  Junior Lee MD for shortness of breath     PCP: Junior Lee MD    HISTORY OF PRESENT ILLNESS: Jeanetta Hodgkins is a 80y.o. year old female with a history of asthma/copd who presents :     History  Patient diagnosed with COPD with a pulmonary function test 2017 demonstrating FEV1 64%, bronchodilator response, DLCO 63%. Current history  COPD  Using Trelegy daily. No issues with cost or side effects   Using albuterol rarely. Breathing improved after starting lasix regiment in August 2020       Allergies  No issues. Claritin and Montelukast.     Allergies  Having eye symptoms. PAST MEDICAL HISTORY:  Past Medical History:   Diagnosis Date    CAD (coronary artery disease)     Hypertension        PAST SURGICAL HISTORY:  Past Surgical History:   Procedure Laterality Date    CORONARY ANGIOPLASTY WITH STENT PLACEMENT      1998       FAMILY HISTORY:  family history includes COPD in her mother. SOCIAL HISTORY:   reports that she has quit smoking. Her smoking use included cigarettes. She has never used smokeless tobacco.      ALLERGIES:  Patient has No Known Allergies. Objective:   PHYSICAL EXAM:  Blood pressure 98/60, pulse 60, temperature 97.6 °F (36.4 °C), temperature source Infrared, resp. rate 16, height 5' (1.524 m), weight 150 lb (68 kg), SpO2 91 %, not currently breastfeeding.'    Gen: No distress. Eyes:    ENT:    Neck:    Resp: No accessory muscle use. No crackles. No wheezes. No rhonchi. CV: Regular rate. Regular rhythm. No murmur or rub. No edema. GI:    Skin:    Lymph:    M/S: No cyanosis. No clubbing. Neuro: Moves all four extremities. Psych: Oriented x 3. No anxiety. Awake. Alert. Intact judgement and insight.      Current Outpatient Medications   Medication Sig Dispense Refill    aspirin 81 MG chewable tablet Take 81 mg by mouth daily      furosemide (LASIX) 20 MG tablet Take 20 mg by mouth daily Takes 1/2 tab daily      albuterol sulfate  (90 Base) MCG/ACT inhaler Inhale 2 puffs into the lungs every 6 hours as needed for Wheezing or Shortness of Breath 1 Inhaler 5    albuterol (PROVENTIL) (2.5 MG/3ML) 0.083% nebulizer solution Take 3 mLs by nebulization 4 times daily 480 mL 5    amLODIPine (NORVASC) 5 MG tablet Take 5 mg by mouth daily      Cyanocobalamin (VITAMIN B12 PO) Take by mouth daily      TRELEGY ELLIPTA 100-62.5-25 MCG/INH AEPB INHALE 1 PUFF INTO THE LUNGS DAILY 60 each 0    donepezil (ARICEPT) 10 MG tablet Take 10 mg by mouth nightly      memantine (NAMENDA) 10 MG tablet Take 10 mg by mouth 2 times daily      nitroGLYCERIN (NITROSTAT) 0.4 MG SL tablet Place 0.4 mg under the tongue every 5 minutes as needed for Chest pain up to max of 3 total doses. If no relief after 1 dose, call 911.  sertraline (ZOLOFT) 25 MG tablet Take 25 mg by mouth daily      irbesartan (AVAPRO) 300 MG tablet Take 300 mg by mouth nightly      levothyroxine (SYNTHROID) 150 MCG tablet       isosorbide mononitrate (IMDUR) 30 MG extended release tablet       sotalol (BETAPACE) 80 MG tablet       alendronate (FOSAMAX) 70 MG tablet 70 mg once a week      ALPRAZolam (XANAX) 0.25 MG tablet 0.25 mg as needed      ezetimibe (ZETIA) 10 MG tablet Take 10 mg by mouth daily      Ascorbic Acid (VITAMIN C) 1000 MG tablet Take 1,000 mg by mouth daily      Coenzyme Q10 (CO Q 10 PO) Take by mouth daily      Multiple Vitamins-Minerals (OCUVITE PO) Take by mouth daily      calcium carbonate (OSCAL) 500 MG TABS tablet Take 500 mg by mouth 2 times daily      montelukast (SINGULAIR) 10 MG tablet 10 mg nightly       No current facility-administered medications for this visit.         Data Reviewed:   Category 1 Data points:         Notes Reviewed: 0     No recent chest imaging reports    Total labs reviewed 0    Category 2 Data points:    Radiology Review:  Independent interpretation of 0    Category 3 Data points:    Discussed management or interpretation of test with external provider: 0     Assessment:       · Chronic bronchitis versus asthmatic bronchitis  · Chronic rhinitis    Plan:            Problem List Items Addressed This Visit     Other allergic rhinitis     Controlled with Claritin and montelukast.         COPD, mild ([address]     Controlled with Trelegy. Shortness of breath improved after starting on Lasix regimen.

## 2021-02-10 ENCOUNTER — TELEPHONE (OUTPATIENT)
Dept: PULMONOLOGY | Age: 86
End: 2021-02-10

## 2021-02-10 DIAGNOSIS — J44.9 COPD, MILD (HCC): ICD-10-CM

## 2021-02-10 RX ORDER — FLUTICASONE FUROATE, UMECLIDINIUM BROMIDE AND VILANTEROL TRIFENATATE 100; 62.5; 25 UG/1; UG/1; UG/1
POWDER RESPIRATORY (INHALATION)
Qty: 1 EACH | Refills: 11 | Status: SHIPPED | OUTPATIENT
Start: 2021-02-10 | End: 2021-07-01

## 2021-02-10 NOTE — TELEPHONE ENCOUNTER
Patient calling because she needs a refill on her trelegy. She thought that was going to be sent in after her last office visit but was told  the pharmacy does not have it. Please send Milli Watkins on [address], [address] 2/4/21  NOV 8/11/21

## 2021-08-24 ENCOUNTER — TELEPHONE (OUTPATIENT)
Dept: PULMONOLOGY | Age: 86
End: 2021-08-24

## 2021-08-24 NOTE — TELEPHONE ENCOUNTER
Spoke with patients daughter yesterday who said Mike Sinclair is going out of town to stay with her sister and would like next months appt changed to virtual.      I changed the appt to virtual but was unaware that the person could not be out of state and do a virtual visit. I LMOM to see if the patient is visiting her sister in [place] or another [facility] and will change appt accordingly.

## 2021-08-26 NOTE — TELEPHONE ENCOUNTER
Spoke with patient. Her sister lives in [place] and she will be gone for 2 weeks. I let her know that we would not be able to do the visit as virtual since she was out of state. I canceled appt and she will have daughter call back and reschedule for when she is back in town.

## 2021-11-22 ENCOUNTER — VIRTUAL VISIT (OUTPATIENT)
Dept: PULMONOLOGY | Age: 86
End: 2021-11-22
Payer: COMMERCIAL

## 2021-11-22 DIAGNOSIS — J44.9 COPD, MILD (HCC): ICD-10-CM

## 2021-11-22 DIAGNOSIS — J30.89 OTHER ALLERGIC RHINITIS: ICD-10-CM

## 2021-11-22 PROCEDURE — 99214 OFFICE O/P EST MOD 30 MIN: CPT | Performed by: INTERNAL MEDICINE

## 2021-11-22 NOTE — PROGRESS NOTES
REASON FOR CONSULTATION/CC: copd vs asthma      Consult at request of  Carlee Bennett MD for shortness of breath     PCP: Carlee Bennett MD    HISTORY OF PRESENT ILLNESS: Vickey Delaney is a 80y.o. year old female with a history of asthma/copd who presents :     History  Patient diagnosed with COPD with a pulmonary function test 2017 demonstrating FEV1 64%, bronchodilator response, DLCO 63%. Current history        COPD  Trelegy  albuterol  No issues with cost or side effects   Has nebulizer but has not used for a while. Allergies  Singulair                Objective:   PHYSICAL EXAM:  not currently breastfeeding.'    Gen: No distress. Eyes:    ENT:    Neck:    Resp: No accessory muscle use. No crackles. No wheezes. No rhonchi. CV: Regular rate. Regular rhythm. No murmur or rub. No edema. GI:    Skin:    Lymph:    M/S: No cyanosis. No clubbing. Neuro: Moves all four extremities. Psych: Oriented x 3. No anxiety. Awake. Alert. Intact judgement and insight. Data Reviewed:        Assessment:       · Chronic bronchitis versus asthmatic bronchitis  · Chronic rhinitis    Plan:            Problem List Items Addressed This Visit     Other allergic rhinitis      Singulair          COPD, mild (HCC)     Controlled on Trelegy and albuterol     CAT 21. This visit was completed virtually using Doxy. me  Provider: Walker Antunez MD  Location of patient:   [address] [place] [id number]   Location of provider:   [facility] PULMONOLOGY SLEEP AND CRITICAL CARE  [address] [facility]. [address] [place] [address]  Dept: [phone]  Dept Fax: [phone]  Loc: [phone]    Consent: given  Other parties on phone call: daughter.

## 2022-05-01 DIAGNOSIS — J44.9 COPD, MILD (HCC): ICD-10-CM

## 2022-05-02 RX ORDER — FLUTICASONE FUROATE, UMECLIDINIUM BROMIDE AND VILANTEROL TRIFENATATE 100; 62.5; 25 UG/1; UG/1; UG/1
POWDER RESPIRATORY (INHALATION)
Qty: 60 EACH | Refills: 11 | Status: SHIPPED | OUTPATIENT
Start: 2022-05-02

## 2022-05-13 DIAGNOSIS — J44.9 CHRONIC BRONCHITIS WITH COPD (CHRONIC OBSTRUCTIVE PULMONARY DISEASE) (HCC): ICD-10-CM

## 2022-05-13 RX ORDER — ALBUTEROL SULFATE 90 UG/1
2 AEROSOL, METERED RESPIRATORY (INHALATION) EVERY 6 HOURS PRN
Qty: 8.5 G | Refills: 11 | Status: SHIPPED | OUTPATIENT
Start: 2022-05-13

## 2022-05-16 ENCOUNTER — OFFICE VISIT (OUTPATIENT)
Dept: PULMONOLOGY | Age: 87
End: 2022-05-16
Payer: COMMERCIAL

## 2022-05-16 VITALS
DIASTOLIC BLOOD PRESSURE: 60 MMHG | WEIGHT: 137 LBS | BODY MASS INDEX: 26.9 KG/M2 | TEMPERATURE: 97.7 F | SYSTOLIC BLOOD PRESSURE: 120 MMHG | OXYGEN SATURATION: 94 % | HEIGHT: 60 IN | RESPIRATION RATE: 16 BRPM | HEART RATE: 55 BPM

## 2022-05-16 DIAGNOSIS — J44.9 COPD, MILD (HCC): ICD-10-CM

## 2022-05-16 DIAGNOSIS — J30.89 OTHER ALLERGIC RHINITIS: ICD-10-CM

## 2022-05-16 PROCEDURE — 99214 OFFICE O/P EST MOD 30 MIN: CPT | Performed by: INTERNAL MEDICINE

## 2022-05-16 RX ORDER — ROSUVASTATIN CALCIUM 10 MG/1
10 TABLET, COATED ORAL NIGHTLY
COMMUNITY
Start: 2022-05-01

## 2022-05-16 ASSESSMENT — COPD QUESTIONNAIRES
QUESTION8_ENERGYLEVEL: 3
QUESTION7_SLEEPQUALITY: 1
QUESTION1_COUGHFREQUENCY: 3
CAT_TOTALSCORE: 20
QUESTION2_CHESTPHLEGM: 2
QUESTION3_CHESTTIGHTNESS: 3
QUESTION4_WALKINCLINE: 3
QUESTION5_HOMEACTIVITIES: 2
QUESTION6_LEAVINGHOUSE: 3

## 2022-09-16 ENCOUNTER — APPOINTMENT (OUTPATIENT)
Dept: CT IMAGING | Age: 87
DRG: 189 | End: 2022-09-16
Payer: MEDICARE

## 2022-09-16 ENCOUNTER — HOSPITAL ENCOUNTER (INPATIENT)
Age: 87
LOS: 2 days | Discharge: HOME HEALTH | DRG: 189 | End: 2022-09-18
Attending: EMERGENCY MEDICINE | Admitting: FAMILY MEDICINE
Payer: MEDICARE

## 2022-09-16 ENCOUNTER — APPOINTMENT (OUTPATIENT)
Dept: GENERAL RADIOLOGY | Age: 87
DRG: 189 | End: 2022-09-16
Payer: MEDICARE

## 2022-09-16 DIAGNOSIS — J96.01 ACUTE RESPIRATORY FAILURE WITH HYPOXIA AND HYPERCAPNIA (HCC): Primary | ICD-10-CM

## 2022-09-16 DIAGNOSIS — I50.32 DIASTOLIC DYSFUNCTION WITH CHRONIC HEART FAILURE (HCC): ICD-10-CM

## 2022-09-16 DIAGNOSIS — J96.02 ACUTE RESPIRATORY FAILURE WITH HYPOXIA AND HYPERCAPNIA (HCC): Primary | ICD-10-CM

## 2022-09-16 DIAGNOSIS — R91.8 LUNG MASS: ICD-10-CM

## 2022-09-16 DIAGNOSIS — J44.9 COPD, MILD (HCC): ICD-10-CM

## 2022-09-16 DIAGNOSIS — R79.89 ELEVATED BRAIN NATRIURETIC PEPTIDE (BNP) LEVEL: ICD-10-CM

## 2022-09-16 PROBLEM — I25.10 CORONARY ARTERY DISEASE INVOLVING NATIVE CORONARY ARTERY OF NATIVE HEART WITHOUT ANGINA PECTORIS: Status: ACTIVE | Noted: 2022-09-16

## 2022-09-16 PROBLEM — I10 PRIMARY HYPERTENSION: Status: ACTIVE | Noted: 2022-09-16

## 2022-09-16 LAB
A/G RATIO: 1.7 (ref 1.1–2.2)
ALBUMIN SERPL-MCNC: 4.5 G/DL (ref 3.4–5)
ALP BLD-CCNC: 58 U/L (ref 40–129)
ALT SERPL-CCNC: 17 U/L (ref 10–40)
ANION GAP SERPL CALCULATED.3IONS-SCNC: 6 MMOL/L (ref 3–16)
AST SERPL-CCNC: 27 U/L (ref 15–37)
BASE EXCESS ARTERIAL: 4.7 MMOL/L (ref -3–3)
BASOPHILS ABSOLUTE: 0 K/UL (ref 0–0.2)
BASOPHILS RELATIVE PERCENT: 0.6 %
BILIRUB SERPL-MCNC: 0.8 MG/DL (ref 0–1)
BUN BLDV-MCNC: 30 MG/DL (ref 7–20)
CALCIUM SERPL-MCNC: 10 MG/DL (ref 8.3–10.6)
CARBOXYHEMOGLOBIN ARTERIAL: 0.8 % (ref 0–1.5)
CHLORIDE BLD-SCNC: 105 MMOL/L (ref 99–110)
CO2: 35 MMOL/L (ref 21–32)
CREAT SERPL-MCNC: 1.1 MG/DL (ref 0.6–1.2)
D DIMER: 1.05 UG/ML FEU (ref 0–0.6)
EKG ATRIAL RATE: 59 BPM
EKG DIAGNOSIS: NORMAL
EKG P AXIS: 78 DEGREES
EKG P-R INTERVAL: 178 MS
EKG Q-T INTERVAL: 512 MS
EKG QRS DURATION: 136 MS
EKG QTC CALCULATION (BAZETT): 506 MS
EKG R AXIS: -54 DEGREES
EKG T AXIS: 84 DEGREES
EKG VENTRICULAR RATE: 59 BPM
EOSINOPHILS ABSOLUTE: 0.1 K/UL (ref 0–0.6)
EOSINOPHILS RELATIVE PERCENT: 1.3 %
GFR AFRICAN AMERICAN: 56
GFR NON-AFRICAN AMERICAN: 47
GLUCOSE BLD-MCNC: 190 MG/DL (ref 70–99)
HCO3 ARTERIAL: 32.3 MMOL/L (ref 21–29)
HCT VFR BLD CALC: 39.9 % (ref 36–48)
HEMOGLOBIN, ART, EXTENDED: 12.7 G/DL (ref 12–16)
HEMOGLOBIN: 13.3 G/DL (ref 12–16)
LYMPHOCYTES ABSOLUTE: 1.1 K/UL (ref 1–5.1)
LYMPHOCYTES RELATIVE PERCENT: 13.9 %
MCH RBC QN AUTO: 31.2 PG (ref 26–34)
MCHC RBC AUTO-ENTMCNC: 33.2 G/DL (ref 31–36)
MCV RBC AUTO: 93.9 FL (ref 80–100)
METHEMOGLOBIN ARTERIAL: 0.5 %
MONOCYTES ABSOLUTE: 0.5 K/UL (ref 0–1.3)
MONOCYTES RELATIVE PERCENT: 5.8 %
NEUTROPHILS ABSOLUTE: 6.3 K/UL (ref 1.7–7.7)
NEUTROPHILS RELATIVE PERCENT: 78.4 %
O2 SAT, ARTERIAL: 95.4 %
O2 THERAPY: ABNORMAL
PCO2 ARTERIAL: 60.6 MMHG (ref 35–45)
PDW BLD-RTO: 14.5 % (ref 12.4–15.4)
PH ARTERIAL: 7.33 (ref 7.35–7.45)
PLATELET # BLD: 147 K/UL (ref 135–450)
PMV BLD AUTO: 7.5 FL (ref 5–10.5)
PO2 ARTERIAL: 87.9 MMHG (ref 75–108)
POTASSIUM REFLEX MAGNESIUM: 4.7 MMOL/L (ref 3.5–5.1)
PRO-BNP: 6402 PG/ML (ref 0–449)
RBC # BLD: 4.25 M/UL (ref 4–5.2)
SARS-COV-2, NAAT: NOT DETECTED
SODIUM BLD-SCNC: 146 MMOL/L (ref 136–145)
TCO2 ARTERIAL: 34.1 MMOL/L
TOTAL PROTEIN: 7.2 G/DL (ref 6.4–8.2)
TROPONIN: <0.01 NG/ML
TSH SERPL DL<=0.05 MIU/L-ACNC: 1.13 UIU/ML (ref 0.27–4.2)
WBC # BLD: 8 K/UL (ref 4–11)

## 2022-09-16 PROCEDURE — 85025 COMPLETE CBC W/AUTO DIFF WBC: CPT

## 2022-09-16 PROCEDURE — 6370000000 HC RX 637 (ALT 250 FOR IP): Performed by: FAMILY MEDICINE

## 2022-09-16 PROCEDURE — 2580000003 HC RX 258: Performed by: FAMILY MEDICINE

## 2022-09-16 PROCEDURE — 36600 WITHDRAWAL OF ARTERIAL BLOOD: CPT

## 2022-09-16 PROCEDURE — 71260 CT THORAX DX C+: CPT

## 2022-09-16 PROCEDURE — 1200000000 HC SEMI PRIVATE

## 2022-09-16 PROCEDURE — 93005 ELECTROCARDIOGRAM TRACING: CPT | Performed by: NURSE PRACTITIONER

## 2022-09-16 PROCEDURE — 6370000000 HC RX 637 (ALT 250 FOR IP): Performed by: EMERGENCY MEDICINE

## 2022-09-16 PROCEDURE — 80053 COMPREHEN METABOLIC PANEL: CPT

## 2022-09-16 PROCEDURE — 36415 COLL VENOUS BLD VENIPUNCTURE: CPT

## 2022-09-16 PROCEDURE — 84443 ASSAY THYROID STIM HORMONE: CPT

## 2022-09-16 PROCEDURE — 6360000002 HC RX W HCPCS: Performed by: FAMILY MEDICINE

## 2022-09-16 PROCEDURE — 82803 BLOOD GASES ANY COMBINATION: CPT

## 2022-09-16 PROCEDURE — 94760 N-INVAS EAR/PLS OXIMETRY 1: CPT

## 2022-09-16 PROCEDURE — 83880 ASSAY OF NATRIURETIC PEPTIDE: CPT

## 2022-09-16 PROCEDURE — 99285 EMERGENCY DEPT VISIT HI MDM: CPT

## 2022-09-16 PROCEDURE — 2700000000 HC OXYGEN THERAPY PER DAY

## 2022-09-16 PROCEDURE — 93010 ELECTROCARDIOGRAM REPORT: CPT | Performed by: INTERNAL MEDICINE

## 2022-09-16 PROCEDURE — 84484 ASSAY OF TROPONIN QUANT: CPT

## 2022-09-16 PROCEDURE — 6360000004 HC RX CONTRAST MEDICATION: Performed by: INTERNAL MEDICINE

## 2022-09-16 PROCEDURE — 71045 X-RAY EXAM CHEST 1 VIEW: CPT

## 2022-09-16 PROCEDURE — 6360000002 HC RX W HCPCS: Performed by: NURSE PRACTITIONER

## 2022-09-16 PROCEDURE — 99223 1ST HOSP IP/OBS HIGH 75: CPT | Performed by: INTERNAL MEDICINE

## 2022-09-16 PROCEDURE — 87635 SARS-COV-2 COVID-19 AMP PRB: CPT

## 2022-09-16 PROCEDURE — 94640 AIRWAY INHALATION TREATMENT: CPT

## 2022-09-16 PROCEDURE — 96374 THER/PROPH/DIAG INJ IV PUSH: CPT

## 2022-09-16 PROCEDURE — 85379 FIBRIN DEGRADATION QUANT: CPT

## 2022-09-16 RX ORDER — ASPIRIN 81 MG/1
81 TABLET, CHEWABLE ORAL DAILY
Status: DISCONTINUED | OUTPATIENT
Start: 2022-09-16 | End: 2022-09-18 | Stop reason: HOSPADM

## 2022-09-16 RX ORDER — SODIUM CHLORIDE 9 MG/ML
INJECTION, SOLUTION INTRAVENOUS PRN
Status: DISCONTINUED | OUTPATIENT
Start: 2022-09-16 | End: 2022-09-18 | Stop reason: HOSPADM

## 2022-09-16 RX ORDER — SOTALOL HYDROCHLORIDE 80 MG/1
80 TABLET ORAL 2 TIMES DAILY
Status: DISCONTINUED | OUTPATIENT
Start: 2022-09-16 | End: 2022-09-18

## 2022-09-16 RX ORDER — ACETAMINOPHEN 325 MG/1
650 TABLET ORAL EVERY 6 HOURS PRN
Status: DISCONTINUED | OUTPATIENT
Start: 2022-09-16 | End: 2022-09-18 | Stop reason: HOSPADM

## 2022-09-16 RX ORDER — EZETIMIBE 10 MG/1
10 TABLET ORAL DAILY
Status: DISCONTINUED | OUTPATIENT
Start: 2022-09-16 | End: 2022-09-18 | Stop reason: HOSPADM

## 2022-09-16 RX ORDER — FUROSEMIDE 10 MG/ML
40 INJECTION INTRAMUSCULAR; INTRAVENOUS DAILY
Status: DISCONTINUED | OUTPATIENT
Start: 2022-09-17 | End: 2022-09-17

## 2022-09-16 RX ORDER — ONDANSETRON 2 MG/ML
4 INJECTION INTRAMUSCULAR; INTRAVENOUS EVERY 6 HOURS PRN
Status: DISCONTINUED | OUTPATIENT
Start: 2022-09-16 | End: 2022-09-18 | Stop reason: HOSPADM

## 2022-09-16 RX ORDER — QUETIAPINE FUMARATE 25 MG/1
25 TABLET, FILM COATED ORAL NIGHTLY
Status: DISCONTINUED | OUTPATIENT
Start: 2022-09-16 | End: 2022-09-18 | Stop reason: HOSPADM

## 2022-09-16 RX ORDER — AMLODIPINE BESYLATE 5 MG/1
5 TABLET ORAL DAILY
COMMUNITY
Start: 2022-07-30

## 2022-09-16 RX ORDER — SODIUM CHLORIDE 0.9 % (FLUSH) 0.9 %
5-40 SYRINGE (ML) INJECTION EVERY 12 HOURS SCHEDULED
Status: DISCONTINUED | OUTPATIENT
Start: 2022-09-16 | End: 2022-09-18 | Stop reason: HOSPADM

## 2022-09-16 RX ORDER — ISOSORBIDE MONONITRATE 30 MG/1
30 TABLET, EXTENDED RELEASE ORAL DAILY
Status: DISCONTINUED | OUTPATIENT
Start: 2022-09-16 | End: 2022-09-18 | Stop reason: HOSPADM

## 2022-09-16 RX ORDER — DONEPEZIL HYDROCHLORIDE 10 MG/1
10 TABLET, FILM COATED ORAL NIGHTLY
Status: DISCONTINUED | OUTPATIENT
Start: 2022-09-16 | End: 2022-09-18 | Stop reason: HOSPADM

## 2022-09-16 RX ORDER — ACETAMINOPHEN 650 MG/1
650 SUPPOSITORY RECTAL EVERY 6 HOURS PRN
Status: DISCONTINUED | OUTPATIENT
Start: 2022-09-16 | End: 2022-09-18 | Stop reason: HOSPADM

## 2022-09-16 RX ORDER — ONDANSETRON 4 MG/1
4 TABLET, ORALLY DISINTEGRATING ORAL EVERY 8 HOURS PRN
Status: DISCONTINUED | OUTPATIENT
Start: 2022-09-16 | End: 2022-09-18 | Stop reason: HOSPADM

## 2022-09-16 RX ORDER — MEMANTINE HYDROCHLORIDE 10 MG/1
10 TABLET ORAL 2 TIMES DAILY
Status: DISCONTINUED | OUTPATIENT
Start: 2022-09-16 | End: 2022-09-18 | Stop reason: HOSPADM

## 2022-09-16 RX ORDER — CALCIUM CARBONATE 500(1250)
500 TABLET ORAL 2 TIMES DAILY WITH MEALS
Status: DISCONTINUED | OUTPATIENT
Start: 2022-09-16 | End: 2022-09-18 | Stop reason: HOSPADM

## 2022-09-16 RX ORDER — ROSUVASTATIN CALCIUM 10 MG/1
10 TABLET, COATED ORAL NIGHTLY
Status: DISCONTINUED | OUTPATIENT
Start: 2022-09-16 | End: 2022-09-18 | Stop reason: HOSPADM

## 2022-09-16 RX ORDER — ENOXAPARIN SODIUM 100 MG/ML
30 INJECTION SUBCUTANEOUS NIGHTLY
Status: DISCONTINUED | OUTPATIENT
Start: 2022-09-16 | End: 2022-09-18 | Stop reason: HOSPADM

## 2022-09-16 RX ORDER — IPRATROPIUM BROMIDE AND ALBUTEROL SULFATE 2.5; .5 MG/3ML; MG/3ML
1 SOLUTION RESPIRATORY (INHALATION) ONCE
Status: COMPLETED | OUTPATIENT
Start: 2022-09-16 | End: 2022-09-16

## 2022-09-16 RX ORDER — SODIUM CHLORIDE 0.9 % (FLUSH) 0.9 %
5-40 SYRINGE (ML) INJECTION PRN
Status: DISCONTINUED | OUTPATIENT
Start: 2022-09-16 | End: 2022-09-18 | Stop reason: HOSPADM

## 2022-09-16 RX ORDER — LOSARTAN POTASSIUM 100 MG/1
100 TABLET ORAL NIGHTLY
Status: DISCONTINUED | OUTPATIENT
Start: 2022-09-16 | End: 2022-09-18 | Stop reason: HOSPADM

## 2022-09-16 RX ORDER — ENOXAPARIN SODIUM 100 MG/ML
40 INJECTION SUBCUTANEOUS DAILY
Status: DISCONTINUED | OUTPATIENT
Start: 2022-09-16 | End: 2022-09-16 | Stop reason: DRUGHIGH

## 2022-09-16 RX ORDER — M-VIT,TX,IRON,MINS/CALC/FOLIC 27MG-0.4MG
1 TABLET ORAL DAILY
COMMUNITY

## 2022-09-16 RX ORDER — FUROSEMIDE 10 MG/ML
40 INJECTION INTRAMUSCULAR; INTRAVENOUS ONCE
Status: COMPLETED | OUTPATIENT
Start: 2022-09-16 | End: 2022-09-16

## 2022-09-16 RX ORDER — QUETIAPINE FUMARATE 25 MG/1
25 TABLET, FILM COATED ORAL NIGHTLY
COMMUNITY

## 2022-09-16 RX ORDER — LEVOTHYROXINE SODIUM 0.07 MG/1
150 TABLET ORAL DAILY
Status: DISCONTINUED | OUTPATIENT
Start: 2022-09-16 | End: 2022-09-18 | Stop reason: HOSPADM

## 2022-09-16 RX ORDER — LORATADINE 10 MG/1
10 TABLET ORAL DAILY PRN
COMMUNITY

## 2022-09-16 RX ORDER — AMLODIPINE BESYLATE 5 MG/1
5 TABLET ORAL DAILY
Status: DISCONTINUED | OUTPATIENT
Start: 2022-09-16 | End: 2022-09-18 | Stop reason: HOSPADM

## 2022-09-16 RX ORDER — POLYETHYLENE GLYCOL 3350 17 G/17G
17 POWDER, FOR SOLUTION ORAL DAILY PRN
Status: DISCONTINUED | OUTPATIENT
Start: 2022-09-16 | End: 2022-09-18 | Stop reason: HOSPADM

## 2022-09-16 RX ADMIN — FUROSEMIDE 40 MG: 10 INJECTION, SOLUTION INTRAMUSCULAR; INTRAVENOUS at 11:09

## 2022-09-16 RX ADMIN — IPRATROPIUM BROMIDE AND ALBUTEROL SULFATE 1 AMPULE: .5; 3 SOLUTION RESPIRATORY (INHALATION) at 08:57

## 2022-09-16 RX ADMIN — EZETIMIBE 10 MG: 10 TABLET ORAL at 14:26

## 2022-09-16 RX ADMIN — SERTRALINE 75 MG: 50 TABLET, FILM COATED ORAL at 22:36

## 2022-09-16 RX ADMIN — ASPIRIN 81 MG 81 MG: 81 TABLET ORAL at 14:27

## 2022-09-16 RX ADMIN — ROSUVASTATIN CALCIUM 10 MG: 10 TABLET, FILM COATED ORAL at 22:36

## 2022-09-16 RX ADMIN — SOTALOL HYDROCHLORIDE 80 MG: 80 TABLET ORAL at 22:35

## 2022-09-16 RX ADMIN — AMLODIPINE BESYLATE 5 MG: 5 TABLET ORAL at 14:25

## 2022-09-16 RX ADMIN — ISOSORBIDE MONONITRATE 30 MG: 30 TABLET, EXTENDED RELEASE ORAL at 14:25

## 2022-09-16 RX ADMIN — Medication 10 ML: at 22:40

## 2022-09-16 RX ADMIN — QUETIAPINE FUMARATE 25 MG: 25 TABLET ORAL at 22:35

## 2022-09-16 RX ADMIN — DONEPEZIL HYDROCHLORIDE 10 MG: 10 TABLET, FILM COATED ORAL at 22:36

## 2022-09-16 RX ADMIN — MOMETASONE FUROATE AND FORMOTEROL FUMARATE DIHYDRATE 2 PUFF: 200; 5 AEROSOL RESPIRATORY (INHALATION) at 20:00

## 2022-09-16 RX ADMIN — ENOXAPARIN SODIUM 30 MG: 100 INJECTION SUBCUTANEOUS at 22:35

## 2022-09-16 RX ADMIN — LOSARTAN POTASSIUM 100 MG: 100 TABLET, FILM COATED ORAL at 22:36

## 2022-09-16 RX ADMIN — IOPAMIDOL 75 ML: 755 INJECTION, SOLUTION INTRAVENOUS at 17:49

## 2022-09-16 RX ADMIN — MEMANTINE HYDROCHLORIDE 10 MG: 10 TABLET ORAL at 22:35

## 2022-09-16 ASSESSMENT — PAIN - FUNCTIONAL ASSESSMENT: PAIN_FUNCTIONAL_ASSESSMENT: NONE - DENIES PAIN

## 2022-09-16 NOTE — ACP (ADVANCE CARE PLANNING)
Advance Care Planning     Advance Care Planning Activator (Inpatient)  Conversation Note      Date of ACP Conversation: 9/16/2022     Conversation Conducted with: Patient with Decision Making Capacity    ACP Activator: [address] [facility] Decision Maker:     Current Designated Health Care Decision Maker:     Primary Decision Maker: Sandy Parekh - Child - [phone]    Primary Decision Maker: [address] - [phone]    Care Preferences    Ventilation: \"If you were in your present state of health and suddenly became very ill and were unable to breathe on your own, what would your preference be about the use of a ventilator (breathing machine) if it were available to you? \"      Would the patient desire the use of ventilator (breathing machine)?: yes    \"If your health worsens and it becomes clear that your chance of recovery is unlikely, what would your preference be about the use of a ventilator (breathing machine) if it were available to you? \"     Would the patient desire the use of ventilator (breathing machine)?: No      Resuscitation  \"CPR works best to restart the heart when there is a sudden event, like a heart attack, in someone who is otherwise healthy. Unfortunately, CPR does not typically restart the heart for people who have serious health conditions or who are very sick. \"    \"In the event your heart stopped as a result of an underlying serious health condition, would you want attempts to be made to restart your heart (answer \"yes\" for attempt to resuscitate) or would you prefer a natural death (answer \"no\" for do not attempt to resuscitate)? \" no       [] Yes   [] No   Educated Patient / Maren Salinas regarding differences between Advance Directives and portable DNR orders.     Length of ACP Conversation in minutes:      Conversation Outcomes:  [x] ACP discussion completed  [] Existing advance directive reviewed with patient; no changes to patient's previously recorded wishes  [] New Advance Directive completed  [] Portable Do Not Rescitate prepared for Provider review and signature  [] POLST/POST/MOLST/MOST prepared for Provider review and signature      Follow-up plan:    [] Schedule follow-up conversation to continue planning  [] Referred individual to Provider for additional questions/concerns   [] Advised patient/agent/surrogate to review completed ACP document and update if needed with changes in condition, patient preferences or care setting    [x] This note routed to one or more involved healthcare providers      Electronically signed by MERON Nair, MEGANW, Case Management on 9/16/2022 at 4:33 PM  [place] [id number]

## 2022-09-16 NOTE — PROGRESS NOTES
Pt arrived to room 3127 from ED. VSS  Pt oriented to room, bed, phone, and call light. Pt educated to call before ambulating at this time. Fall precautions in place. Bed locked and in lowest position, alarm on, side rails up x2. Call light and all belongings within reach. Family at bedside at this time. Will continue to check on pt. every 2 hours to monitor pt's safety and assess pt needs.  Electronically signed by Wing Jessica RN on 9/16/2022 at 6:09 PM

## 2022-09-16 NOTE — ED PROVIDER NOTES
[address] [facility]      Pt Name: Madison Rojo  MRN: [id number]  Darrelltrongflindsey 11/26/1932  Date of evaluation: 9/16/2022  Provider: Trisha Noel [id number]  Chief Complaint   Patient presents with    Shortness of Breath     Pt to ED via EMS with CC of SOB starting in the shower this AM.  Pt presents to ED on CPAP. EMS states that pt was 60% on RA. Pt does not wear oxygen at home. Pt's oxygen 100% on CPAP, RT at bedside changes pt to 3L via NC, oxygen remains >95%. Pt denies CP, cough, nausea or vomiting. I have fully participated in the care of Madison Rojo and have had a face-to-face evaluation. I have reviewed and agree with all pertinent clinical information, and midlevel provider's history, and physical exam. I have also reviewed the labs, EKG, and imaging studies and treatment plan. I have also reviewed and agree with the medications, allergies and past medical history section for this Madison Rojo. I agree with the diagnosis, and I concur. I wore personal protective equipment when I was in the room the entire time. This includes gloves, N95 mask, face shield, and a glove over my stethoscope for protection. Past Medical History:   Diagnosis Date    CAD (coronary artery disease)     Hypertension        MDM:  Madison Rojo is a 80 y.o. female who presents with shortness of breath suddenly while she was getting out of the shower today. She does not wear oxygen at home. She has a nebulizer machine at home. She denies any fevers or chills. Exertion makes it worse and rest makes it better. She denies any chest pains. She quit smoking many many years ago. Physical exam reveals a patient be dyspneic and speaking in 5-7 word sentences. Breath sounds are extremely decreased. Abdomen soft nontender. Extremities there is no edema. Heart is regular rate and rhythm without murmurs clicks or rubs. Laboratory work and imaging did not reveal a cause for symptoms. Patient still feels dyspneic. Therefore she was admitted to the hospital for COPD exacerbation. Her BNP was slightly elevated at 6402. Vitals:    09/16/22 1001   BP:    Pulse: 66   Resp: 20   Temp:    SpO2: 94%       Lab results  Labs Reviewed   COMPREHENSIVE METABOLIC PANEL W/ REFLEX TO MG FOR LOW K - Abnormal; Notable for the following components:       Result Value    Sodium 146 (*)     CO2 35 (*)     Glucose 190 (*)     BUN 30 (*)     GFR Non- 47 (*)     GFR  56 (*)     All other components within normal limits   BRAIN NATRIURETIC PEPTIDE - Abnormal; Notable for the following components:    Pro-BNP 6,402 (*)     All other components within normal limits   BLOOD GAS, ARTERIAL - Abnormal; Notable for the following components:    pH, Arterial 7.334 (*)     pCO2, Arterial 60.6 (*)     HCO3, Arterial 32.3 (*)     Base Excess, Arterial 4.7 (*)     All other components within normal limits   COVID-19, RAPID   CBC WITH AUTO DIFFERENTIAL   TROPONIN         Radiology results  XR CHEST PORTABLE   Final Result   No acute process. Medications   furosemide (LASIX) injection 40 mg (has no administration in time range)   ipratropium-albuterol (DUONEB) nebulizer solution 1 ampule (1 ampule Inhalation Given 9/16/22 0857)       New Prescriptions    No medications on file       The patient's blood pressure was found to be elevated according to CMS/Medicare and the Affordable Care Act/ObamaCare criteria. Elevated blood pressure could occur because of pain or anxiety or other reasons and does not mean that they need to have their blood pressure treated or medications otherwise adjusted. However, this could also be a sign that they will need to have their blood pressure treated or medications changed. The patient was instructed to follow up closely with their personal physician to have their blood pressure rechecked.

## 2022-09-16 NOTE — CARE COORDINATION
INITIAL CASE MANAGEMENT ASSESSMENT    Reviewed chart, met with patient and daughter, Wan Blunt, present in room to assess possible discharge needs. Explained Case Management role/services. Living Situation: Patient lives in a high rise building (15th floor) with an elevator. She lives with her daughter Sherry Stauffer. ADLs: manages on her own; daughter does most of the homemaking. DME: [employer] , nebulizer, inhaler and emergency response system    PT/OT Recs: not ordered at this time     Active Services: none     Transportation: daughter     Medications: takes on her own    PCP: Natalia Brand MD    PLAN/COMMENTS: Patient plans to return home at discharge. She is unsure of needs. She is currently on O2 but not at home. Provided patient with home care list.     The Plan for Transition of Care is related to the following treatment goals: possible home care    The Patient and/or patient representative daughter Wan Blunt was provided with a choice of provider and agrees   with the discharge plan. [x] Yes [] No    Freedom of choice list was provided with basic dialogue that supports the patient's individualized plan of care/goals, treatment preferences and shares the quality data associated with the providers. [x] Yes [] No    SW/CM provided contact information for patient or family to call with any questions. SW/CM will follow and assist as needed.     Electronically signed by MERON Laurent LISW, Case Management on 9/16/2022 at 4:29 PM  [place] [phone]

## 2022-09-16 NOTE — PROGRESS NOTES
Pt returned to room from CT at this time. VSS. Pt tolerating Oral intake with no complications. No complaints of N/V. No complaints of pain. Medication administered with no complications. Assessment and Admission complete at this time. Bed locked and in lowest position, alarm on, side rails up x2. Call light and all belongings within reach. Will continue to check on pt. every 2 hours to monitor pt's safety and assess pt needs.  Electronically signed by Eamon Sinclair RN on 9/16/2022 at 6:11 PM

## 2022-09-16 NOTE — ED PROVIDER NOTES
[facility]  [address] [address]  Dept: [phone]  Loc: [address] ENCOUNTER        I have seen and evaluated this patient with my supervising physician, Dr. Jerry Shepherd. CHIEF COMPLAINT    Chief Complaint   Patient presents with    Shortness of Breath     Pt to ED via EMS with CC of SOB starting in the shower this AM.  Pt presents to ED on CPAP. EMS states that pt was 60% on RA. Pt does not wear oxygen at home. Pt's oxygen 100% on CPAP, RT at bedside changes pt to 3L via NC, oxygen remains >95%. Pt denies CP, cough, nausea or vomiting. HPI    Fiorella Larson is a 80 y.o. nontoxic, well-appearing, but distressed female with a medical history including, but not limited to, hypertension, CAD-with stent placement in 1998, and COPD who presents with shortness of breath. Onset was at 0900 hrs. and she was showering. The duration has been constant since the onset. The context was that the symptoms started spontaneously. The patient has had no associated symptoms. Denies chest pain, nausea, vomiting, lightheadedness, dizziness, presyncope, fever, chills, sweats, cough, change in ability to smell/taste, hemoptysis, leg/calf pain or swelling, headaches, body aches, abdominal pain, diarrhea, urinary symptoms/retention, other concerns. No alleviating factors including no relief with her home nebulizer. Therefore, the patient called EMS for transport here to the emergency department. Patient is oxygen saturations found to be 60% on room air. She was placed on and arrives here in the emergency department with CPAP in place. She is a former smoker but has not smoked for many years. Review of Systems   Constitutional:  Negative for chills, diaphoresis, fatigue and fever. HENT:  Negative for congestion and sore throat. Eyes:  Negative for pain and visual disturbance. Respiratory:  Positive for shortness of breath. Negative for cough. Cardiovascular:  Negative for chest pain and leg swelling. Gastrointestinal:  Negative for abdominal pain, anal bleeding, nausea and vomiting. Genitourinary:  Negative for difficulty urinating, dysuria, frequency and urgency. Musculoskeletal:  Negative for back pain and neck pain. Skin:  Negative for rash and wound. Neurological:  Negative for dizziness and light-headedness. Hematological: Negative. Psychiatric/Behavioral: Negative. PAST MEDICAL & SURGICAL HISTORY    Past Medical History:   Diagnosis Date    CAD (coronary artery disease)     Hypertension      Past Surgical History:   Procedure Laterality Date    CORONARY ANGIOPLASTY WITH STENT PLACEMENT      1998       CURRENT MEDICATIONS  (may include discharge medications prescribed in the ED)      ALLERGIES    No Known Allergies    SOCIAL & FAMILY HISTORY    Social History     Socioeconomic History    Marital status:       Spouse name: None    Number of children: None    Years of education: None    Highest education level: None   Tobacco Use    Smoking status: Former     Types: Cigarettes    Smokeless tobacco: Never    Tobacco comments:     quit 45 years ago - socially   Vaping Use    Vaping Use: Never used   Substance and Sexual Activity    Alcohol use: Yes     Comment: socially    Drug use: No     Family History   Problem Relation Age of Onset    COPD Mother        PHYSICAL EXAM    VITAL SIGNS: /65   Pulse 81   Temp 97.4 °F (36.3 °C) (Oral)   Resp 16   Ht 5' 2\" (1.575 m)   Wt 130 lb 14.4 oz (59.4 kg)   SpO2 98%   BMI 23.94 kg/m²    Constitutional:  Well developed, well nourished, + acute distress   HENT:  Atraumatic, moist mucus membranes  Neck: supple, no JVD   Respiratory:  Lungs diminished x5 lung fields, no wheezes, no retractions   Cardiovascular: Regular rhythm with a bradycardic [email or web address] bpm, no murmurs, rubs, or gallops.  + S1-S2  Vascular: Radial and DP pulses 2+ and equal bilaterally  GI: Soft, nontender, normal bowel sounds  Musculoskeletal:  no lower extremity edema, no lower extremity asymmetry, no calf tenderness, no thigh tenderness, no acute deformities  Integument:  Skin is warm and dry, no petechiae   Neurologic:  Alert & oriented, no slurred speech  Psych: Pleasant affect, no hallucinations    EKG as interpreted by EMD  Please see the physician note for EKG interpretation. RADIOLOGY/PROCEDURES    CXR: No acute process    ED COURSE & MEDICAL DECISION MAKING    Pertinent Labs & Imaging studies reviewed and interpreted. (See chart for details)    See chart for details of medications given during the ED stay. Vitals:    09/16/22 1624 09/16/22 1940 09/16/22 2000 09/16/22 2354   BP:  115/72  108/65   Pulse:  63 74 81   Resp:  17 16 16   Temp:  98 °F (36.7 °C)  97.4 °F (36.3 °C)   TempSrc:  Oral  Oral   SpO2: 91% 94% 96% 98%   Weight:       Height:           Differential Diagnosis: Acute Coronary Syndrome, Myocardial Infarction, Pulmonary Embolus, Pneumonia, Pneumothorax, other    Patient with a history of COPD and coronary artery disease presents emerged department with acute onset of shortness of breath as she was showering. She tried a DuoNeb treatment without relief of symptoms therefore called EMS. Upon EMS arrival the patient was found to be 6% on room air. She was placed on CPAP and transported here to the ED for evaluation. I will obtain basic and cardiac labs, COVID-19, ABG, BNP, EKG, and CXR. Work-up pending. Patient given DuoNeb x1. Work-up pending.     Labs reviewed:  I have reviewed and interpreted all of the currently available lab results from this visit:  Results for orders placed or performed during the hospital encounter of 09/16/22   COVID-19, Rapid    Specimen: Nasopharyngeal Swab   Result Value Ref Range    SARS-CoV-2, NAAT Not Detected Not Detected   CBC with Auto Differential   Result Value Ref Range    WBC 8.0 4.0 - 11.0 K/uL    RBC 4.25 4.00 - 5.20 M/uL Hemoglobin 13.3 12.0 - 16.0 g/dL    Hematocrit 39.9 36.0 - 48.0 %    MCV 93.9 80.0 - 100.0 fL    MCH 31.2 26.0 - 34.0 pg    MCHC 33.2 31.0 - 36.0 g/dL    RDW 14.5 12.4 - 15.4 %    Platelets 530 981 - 512 K/uL    MPV 7.5 5.0 - 10.5 fL    Neutrophils % 78.4 %    Lymphocytes % 13.9 %    Monocytes % 5.8 %    Eosinophils % 1.3 %    Basophils % 0.6 %    Neutrophils Absolute 6.3 1.7 - 7.7 K/uL    Lymphocytes Absolute 1.1 1.0 - 5.1 K/uL    Monocytes Absolute 0.5 0.0 - 1.3 K/uL    Eosinophils Absolute 0.1 0.0 - 0.6 K/uL    Basophils Absolute 0.0 0.0 - 0.2 K/uL   Comprehensive Metabolic Panel w/ Reflex to MG   Result Value Ref Range    Sodium 146 (H) 136 - 145 mmol/L    Potassium reflex Magnesium 4.7 3.5 - 5.1 mmol/L    Chloride 105 99 - 110 mmol/L    CO2 35 (H) 21 - 32 mmol/L    Anion Gap 6 3 - 16    Glucose 190 (H) 70 - 99 mg/dL    BUN 30 (H) 7 - 20 mg/dL    Creatinine 1.1 0.6 - 1.2 mg/dL    GFR Non- 47 (A) >60    GFR  56 (A) >60    Calcium 10.0 8.3 - 10.6 mg/dL    Total Protein 7.2 6.4 - 8.2 g/dL    Albumin 4.5 3.4 - 5.0 g/dL    Albumin/Globulin Ratio 1.7 1.1 - 2.2    Total Bilirubin 0.8 0.0 - 1.0 mg/dL    Alkaline Phosphatase 58 40 - 129 U/L    ALT 17 10 - 40 U/L    AST 27 15 - 37 U/L   Troponin   Result Value Ref Range    Troponin <0.01 <0.01 ng/mL   Brain Natriuretic Peptide   Result Value Ref Range    Pro-BNP 6,402 (H) 0 - 449 pg/mL   Blood Gas, Arterial   Result Value Ref Range    pH, Arterial 7.334 (L) 7.350 - 7.450    pCO2, Arterial 60.6 (H) 35.0 - 45.0 mmHg    pO2, Arterial 87.9 75.0 - 108.0 mmHg    HCO3, Arterial 32.3 (H) 21.0 - 29.0 mmol/L    Base Excess, Arterial 4.7 (H) -3.0 - 3.0 mmol/L    Hemoglobin, Art, Extended 12.7 12.0 - 16.0 g/dL    O2 Sat, Arterial 95.4 >92 %    Carboxyhgb, Arterial 0.8 0.0 - 1.5 %    Methemoglobin, Arterial 0.5 <1.5 %    TCO2, Arterial 34.1 Not Established mmol/L    O2 Therapy Unknown    TSH without Reflex   Result Value Ref Range    TSH 1.13 0.27 - 4. 20 uIU/mL   D-Dimer, Quantitative   Result Value Ref Range    D-Dimer, Quant 1.05 (H) 0.00 - 0.60 ug/mL FEU   EKG 12 Lead   Result Value Ref Range    Ventricular Rate 59 BPM    Atrial Rate 59 BPM    P-R Interval 178 ms    QRS Duration 136 ms    Q-T Interval 512 ms    QTc Calculation (Bazett) 506 ms    P Axis 78 degrees    R Axis -54 degrees    T Axis 84 degrees    Diagnosis       Sinus bradycardiaLeft axis deviationLeft bundle branch blockAbnormal ECGWhen compared with ECG of 25-MAR-2009 11:40,Left bundle branch block has replaced RSR' pattern in [id number] by SILVIO NINO, Deidre Urena ([id number]) on 9/16/2022 12:00:02 PM     Imaging reviewed:      XR CHEST PORTABLE    Result Date: 9/16/2022  EXAMINATION: ONE XRAY VIEW OF THE CHEST 9/16/2022 9:16 am COMPARISON: None. HISTORY: ORDERING SYSTEM PROVIDED HISTORY: SOB TECHNOLOGIST PROVIDED HISTORY: Reason for exam:->SOB Reason for Exam: SOB FINDINGS: The lung fields are clear. The heart is slightly enlarged. There is slight calcification of the aortic arch. There is no pulmonary vascular congestion nor evidence of a pleural abnormality. No acute process. Work-up reveals:  COVID-19: Not detected  VBG: pH 7.33, CO2 elevated at 60.6, bicarb, arterial elevated at 32.3, base excess elevated at 4.7 but otherwise unremarkable  BNP: [email or web address]  Troponin: <6.56  Metabolic panel: Hyponatremic@ 146, CO2 elevated at 35, hyperglycemic at 190, BUN elevated at 30, GFR 47, otherwise unremarkable for elevation LFTs. CBC: Negative for leukocytosis or anemia  EKG, as interpreted by EMD.  Please see his note for details  CXR: No acute process      Given the patient is experiencing acute respiratory failure with hypoxia and hypercapnia patient will be admitted to the hospital for further evaluation and treatment. Patient does have a very elevated BNP concerning for CHF   but her CXR is without acute process/is normal.  She is requiring supplemental oxygen which is not her baseline. Therefore, patient be admitted to the hospital for further evaluation and treatment of acute respiratory failure with hypoxia and hypercapnia as well as elevated BNP. A discussion was had with Mrs. Kimbrough regarding admission to admit for these diagnoses. Patient is agreeable with the plan for admission. Given her BNP she was given Lasix 40 mg IV. Consultation with hospitalist: I contacted Dr. Víctor Jane via [employer], and the patient was accepted for admission. FINAL IMPRESSION    1.  Acute respiratory failure with hypoxia and hypercapnia (HCC)    2. Elevated brain natriuretic peptide (BNP) level        PLAN  Admission to the hospital    (Please note that this note was completed with a voice recognition program.  Every attempt was made to edit the dictations, but inevitably there remain words that are mis-transcribed.)      Diana Corea, DANNY - YANIRA  09/17/22 0055

## 2022-09-16 NOTE — PROGRESS NOTES
Pharmacy Medication Reconciliation Note     List of medications patient is currently taking is complete. Source of information:   1. Conversation with patient   2. EMR    Notes regarding home medications:   1. Patient reports she did not take any of her home medication doses today before presenting to the ER. 2. Patient did not have her medication list with her - somewhat knowledge about her meds. List updated using her dispense history and list from her PCP/cardiologist in [facility] [address]. Amlodipine and sertraline not on PCP/cardiologist list - patient has been filling recently, states she is still taking both. 3. Patient is not currently taking her furosemide - can't remember how long she has been without it. She reports at least a week.     Refugio RodgersD  9/16/2022 10:39 AM

## 2022-09-16 NOTE — ED NOTES
Pt continues resting in bed at this time. Pt's oxygen turned off to test RA saturation. After approximately 5 minutes, pt's oxygen down to 85%. Pt placed back on 1L O2 via NC. Provider notified. Pt continues to verbalize no distress.      Melly Ferguson RN  09/16/22 2718

## 2022-09-16 NOTE — ED NOTES
Pt continues resting in bed at this time. No distress noted. O2 via NC reduced to 1L/min and oxygen remains 100%. Call light in reach, side rails up x2.      Ulisses Umanzor RN  09/16/22 3287 No complaints No complaints No complaints No complaints No complaints No complaints No complaints No complaints No complaints No complaints No complaints No complaints No complaints No complaints No complaints No complaints No complaints No complaints No complaints No complaints No complaints No complaints No complaints No complaints No complaints No complaints No complaints No complaints No complaints No complaints No complaints No complaints No complaints No complaints No complaints No complaints Unable to assess Unable to assess No complaints No complaints No complaints No complaints No complaints No complaints No complaints No complaints No complaints No complaints No complaints No complaints No complaints No complaints No complaints No complaints No complaints Unable to assess No complaints No complaints No complaints No complaints No complaints No complaints No complaints No complaints No complaints No complaints No complaints No complaints No complaints No complaints

## 2022-09-16 NOTE — H&P
Hospital Medicine History & Physical      PCP: Key Garcia MD    Date of Admission: 9/16/2022    Date of Service: Pt seen/examined, with 1st encounter, on 9/16/22 and Admitted to Inpatient     Chief Complaint:  sob      History Of Present Illness: The patient is a 80 y.o. female atrial fib, mild copd and mild hfpef who presents to [facility] with sob. Pre ems she was 60 % on room air but in the ed she was in the high 90s on 2L. She was given lasix 20 mg iv    ED workup  Vitals: 100% on 3L -> down to 1L. Pertinent labs: bnp 6400, abg 7.33/60.6/87.9  Imaging: cxr: no overwhelming evidence of volume overload or infectious process      Past Medical History:        Diagnosis Date    CAD (coronary artery disease)     Hypertension        Past Surgical History:        Procedure Laterality Date    CORONARY ANGIOPLASTY WITH STENT PLACEMENT      1998       Medications Prior to Admission:    Prior to Admission medications    Medication Sig Start Date End Date Taking?  Authorizing Provider   QUEtiapine (SEROQUEL) 25 MG tablet Take 25 mg by mouth at bedtime   Yes Historical Provider, MD   loratadine (CLARITIN) 10 MG tablet Take 10 mg by mouth daily as needed   Yes Historical Provider, MD   Multiple Vitamins-Minerals (THERAPEUTIC MULTIVITAMIN-MINERALS) tablet Take 1 tablet by mouth daily   Yes Historical Provider, MD   amLODIPine (NORVASC) 5 MG tablet Take 5 mg by mouth daily 7/30/22   Historical Provider, MD   rosuvastatin (CRESTOR) 10 MG tablet Take 10 mg by mouth at bedtime 5/1/22   Historical Provider, MD   albuterol sulfate  (90 Base) MCG/ACT inhaler INHALE 2 PUFFS INTO THE LUNGS EVERY 6 HOURS AS NEEDED FOR WHEEZING OR SHORTNESS OF BREATH 5/13/22   Dennie Molt, MD Janise Connors [phone] MCG/INH AEPB INHALE 1 PUFF INTO THE LUNGS DAILY 5/2/22   Rory Nguyen Pattie Hooker, APRN - CNP   aspirin 81 MG chewable tablet Take 81 mg by mouth daily    Historical Provider, MD   furosemide (LASIX) 40 MG tablet Take 40 mg by mouth daily  Patient not taking: Reported on 9/16/2022    Historical Provider, MD   Cyanocobalamin (VITAMIN B12 PO) Take by mouth daily    Historical Provider, MD   donepezil (ARICEPT) 10 MG tablet Take 10 mg by mouth nightly    Historical Provider, MD   memantine (NAMENDA) 10 MG tablet Take 10 mg by mouth 2 times daily    Historical Provider, MD   nitroGLYCERIN (NITROSTAT) 0.4 MG SL tablet Place 0.4 mg under the tongue every 5 minutes as needed for Chest pain up to max of 3 total doses. If no relief after 1 dose, call 911. Historical Provider, MD   sertraline (ZOLOFT) 25 MG tablet Take 75 mg by mouth daily    Historical Provider, MD   irbesartan (AVAPRO) 300 MG tablet Take 300 mg by mouth nightly    Historical Provider, MD   levothyroxine (SYNTHROID) 150 MCG tablet Take 150 mcg by mouth Daily 5/29/17   Historical Provider, MD   isosorbide mononitrate (IMDUR) 30 MG extended release tablet Take 30 mg by mouth daily 5/26/17   Historical Provider, MD   sotalol (BETAPACE) 80 MG tablet Take 80 mg by mouth 2 times daily 5/26/17   Historical Provider, MD   alendronate (FOSAMAX) 70 MG tablet Take 70 mg by mouth once a week 5/16/17   Historical Provider, MD   ezetimibe (ZETIA) 10 MG tablet Take 10 mg by mouth daily    Historical Provider, MD   Ascorbic Acid (VITAMIN C) 1000 MG tablet Take 1,000 mg by mouth daily    Historical Provider, MD   Coenzyme Q10 (CO Q 10 PO) Take by mouth daily    Historical Provider, MD   Multiple Vitamins-Minerals (OCUVITE PO) Take by mouth daily    Historical Provider, MD   calcium carbonate (OSCAL) 500 MG TABS tablet Take 500 mg by mouth 2 times daily    Historical Provider, MD       Allergies:  Patient has no known allergies. Social History:      TOBACCO:   reports that she has quit smoking. Her smoking use included cigarettes.  She has never used smokeless tobacco.  ETOH:   reports current alcohol use. Family History:          Problem Relation Age of Onset    COPD Mother        REVIEW OF SYSTEMS:   Positive for sob and as noted in the HPI. All other systems reviewed and negative. PHYSICAL EXAM:    BP (!) 173/80   Pulse 66   Temp 97.2 °F (36.2 °C) (Axillary)   Resp 20   Ht 5' 2\" (1.575 m)   Wt 130 lb 14.4 oz (59.4 kg)   SpO2 94%   BMI 23.94 kg/m²     General appearance: No apparent distress, cooperative. HEENT Normal cephalic, atraumatic without obvious deformity. PERRL. EOM. Conjunctivae/corneas clear. Neck: Supple, No jugular venous distention/bruits. Trachea midline   Lungs: bl lower lung rales, no wheezing, without accessory muscle use. Heart: Regular rate and rhythm with Normal S1/S2 without murmurs, rubs or gallops  Abdomen: Soft, non-tender or non-distended without rigidity or guarding and positive bowel sounds all four quadrants. Extremities: No clubbing, cyanosis, or edema bilaterally. Skin: Skin color, texture, turgor normal.  No rashes or lesions. Neurologic: Alert and oriented X 3, neurovascularly intact with sensory/motor intact upper extremities/lower extremities, bilaterally. Grossly non-focal.  Mental status: Alert, oriented, thought content appropriate. Peripheral Pulses: +3 Easily felt, not easily obliterated with pressure  Cap refill  +2 sec    CBC   Recent Labs     09/16/22  0916   WBC 8.0   HGB 13.3   HCT 39.9         RENAL  Recent Labs     09/16/22  0916   *   K 4.7      CO2 35*   BUN 30*   CREATININE 1.1     LFT'S  Recent Labs     09/16/22  0916   AST 27   ALT 17   BILITOT 0.8   ALKPHOS 58     COAG  No results for input(s): INR in the last 72 hours.   CARDIAC ENZYMES  Recent Labs     09/16/22  0916   TROPONINI <0.01       U/A:  No results found for: NITRITE, COLORU, WBCUA, RBCUA, MUCUS, BACTERIA, CLARITYU, SPECGRAV, LEUKOCYTESUR, BLOODU, GLUCOSEU, AMORPHOUS    ABG    Lab Results Component Value Date/Time    BWH3FCO 32.3 09/16/2022 09:30 AM    BEART 4.7 09/16/2022 09:30 AM    Z4RBPLIJ 95.4 09/16/2022 09:30 AM    PHART 7.334 09/16/2022 09:30 AM    FAP9IJQ 60.6 09/16/2022 09:30 AM    PO2ART 87.9 09/16/2022 09:30 AM    CBA8HNA 34.1 09/16/2022 09:30 AM           There are no active hospital problems to display for this patient. PHYSICIANS CERTIFICATION:    I certify that Marcus Ortiz is expected to be hospitalized for more than 2 midnights based on the following assessment and plan:      ASSESSMENT/PLAN:    Acute hypoxic and hypercarbic respiratory failure, POA  - with criteria: < 90% on RA, RR> 18  - supplemental oxygen as necessary to keep SaO2 > 90%; not on o2 at baseline  - abg with pH 7.33, pco2 60.6, po2 88  - ct chest to assess for pe    Acute on chronic diastolic CHF with possible exacerbation? - cxr without signs of volume overload  - last ECHO: 8/2020 lvef 50-55%, borderline global hypokinesis of the lv, normal rv function, size  - bnp 6400, higher than her baseline, last was 400  - lasix 40 mg IV daily  - cont BB, ACEi/ARB/arni  - daily wts - wt is lower today than at her cafrdio appt in august one month ago 140 -> 130 here  - I/Os  - consult CHF RN  - consult cardiology    COPD, mild  - fev1 64%  - cont home inhalers    Chronic conditions - continue home meds unless otherwise stated  Covid+ 4/2022  Cad  Paf  Htn  paf, not on ac - one episode in 2013  DVT Prophylaxis: lovenox  Code Status: No Order    Dispo - in pt       Samina Seth, DO    Thank you Yulisa Guajardo MD for the opportunity to be involved in this patient's care. If you have any questions or concerns please feel free to contact me at [phone].

## 2022-09-16 NOTE — PLAN OF CARE
Problem: Discharge Planning  Goal: Discharge to home or other facility with appropriate resources  Flowsheets (Taken 9/16/2022 1493)  Discharge to home or other facility with appropriate resources:   Identify barriers to discharge with patient and caregiver   Arrange for needed discharge resources and transportation as appropriate   Identify discharge learning needs (meds, wound care, etc)   Arrange for interpreters to assist at discharge as needed   Refer to discharge planning if patient needs post-hospital services based on physician order or complex needs related to functional status, cognitive ability or social support system     Problem: Skin/Tissue Integrity  Goal: Absence of new skin breakdown  Description: 1. Monitor for areas of redness and/or skin breakdown  2. Assess vascular access sites hourly  3. Every 4-6 hours minimum:  Change oxygen saturation probe site  4. Every 4-6 hours:  If on nasal continuous positive airway pressure, respiratory therapy assess nares and determine need for appliance change or resting period. Note: Patient skin condition and mucus membrane integrity remain unchanged during this shift. Skin breakdown prevention interventions are in place. Will continue to monitor and assess.         Problem: ABCDS Injury Assessment  Goal: Absence of physical injury  Flowsheets (Taken 9/16/2022 1837)  Absence of Physical Injury: Implement safety measures based on patient assessment     Problem: Respiratory - Adult  Goal: Achieves optimal ventilation and oxygenation  Flowsheets (Taken 9/16/2022 1837)  Achieves optimal ventilation and oxygenation:   Assess for changes in respiratory status   Assess for changes in mentation and behavior   Position to facilitate oxygenation and minimize respiratory effort   Oxygen supplementation based on oxygen saturation or arterial blood gases   Initiate smoking cessation protocol as indicated   Encourage broncho-pulmonary hygiene including cough, deep breathe, incentive spirometry   Assess the need for suctioning and aspirate as needed   Assess and instruct to report shortness of breath or any respiratory difficulty   Respiratory therapy support as indicated     Problem: Cardiovascular - Adult  Goal: Maintains optimal cardiac output and hemodynamic stability  Flowsheets (Taken 9/16/2022 1837)  Maintains optimal cardiac output and hemodynamic stability:   Monitor blood pressure and heart rate   Monitor urine output and notify Licensed Independent Practitioner for values outside of normal range   Assess for signs of decreased cardiac output   Administer fluid and/or volume expanders as ordered   Administer vasoactive medications as ordered  Goal: Absence of cardiac dysrhythmias or at baseline  Flowsheets (Taken 9/16/2022 1837)  Absence of cardiac dysrhythmias or at baseline:   Monitor cardiac rate and rhythm   Assess for signs of decreased cardiac output   Administer antiarrhythmia medication and electrolyte replacement as ordered     Problem: Metabolic/Fluid and Electrolytes - Adult  Goal: Electrolytes maintained within normal limits  Flowsheets (Taken 9/16/2022 1837)  Electrolytes maintained within normal limits:   Monitor labs and assess patient for signs and symptoms of electrolyte imbalances   Administer electrolyte replacement as ordered   Monitor response to electrolyte replacements, including repeat lab results as appropriate   Fluid restriction as ordered   Instruct patient on fluid and nutrition restrictions as appropriate  Goal: Hemodynamic stability and optimal renal function maintained  Flowsheets (Taken 9/16/2022 1837)  Hemodynamic stability and optimal renal function maintained:   Monitor labs and assess for signs and symptoms of volume excess or deficit   Monitor intake, output and patient weight   Monitor urine specific gravity, serum osmolarity and serum sodium as indicated or ordered   Monitor response to interventions for patient's volume status, including labs, urine output, blood pressure (other measures as available)   Encourage oral intake as appropriate   Instruct patient on fluid and nutrition restrictions as appropriate     Problem: Safety - Adult  Goal: Free from fall injury  Flowsheets (Taken 9/16/2022 3014)  Free From Fall Injury: Instruct family/caregiver on patient safety

## 2022-09-17 LAB
ANION GAP SERPL CALCULATED.3IONS-SCNC: 10 MMOL/L (ref 3–16)
BASOPHILS ABSOLUTE: 0 K/UL (ref 0–0.2)
BASOPHILS RELATIVE PERCENT: 0.5 %
BUN BLDV-MCNC: 31 MG/DL (ref 7–20)
CALCIUM SERPL-MCNC: 9.5 MG/DL (ref 8.3–10.6)
CHLORIDE BLD-SCNC: 103 MMOL/L (ref 99–110)
CHOLESTEROL, TOTAL: 93 MG/DL (ref 0–199)
CO2: 32 MMOL/L (ref 21–32)
CREAT SERPL-MCNC: 1.1 MG/DL (ref 0.6–1.2)
EOSINOPHILS ABSOLUTE: 0.1 K/UL (ref 0–0.6)
EOSINOPHILS RELATIVE PERCENT: 1.9 %
GFR AFRICAN AMERICAN: 56
GFR NON-AFRICAN AMERICAN: 47
GLUCOSE BLD-MCNC: 77 MG/DL (ref 70–99)
HCT VFR BLD CALC: 33.3 % (ref 36–48)
HDLC SERPL-MCNC: 48 MG/DL (ref 40–60)
HEMOGLOBIN: 11.2 G/DL (ref 12–16)
LDL CHOLESTEROL CALCULATED: 31 MG/DL
LV EF: 58 %
LVEF MODALITY: NORMAL
LYMPHOCYTES ABSOLUTE: 1.8 K/UL (ref 1–5.1)
LYMPHOCYTES RELATIVE PERCENT: 29.2 %
MAGNESIUM: 2.3 MG/DL (ref 1.8–2.4)
MCH RBC QN AUTO: 31.2 PG (ref 26–34)
MCHC RBC AUTO-ENTMCNC: 33.6 G/DL (ref 31–36)
MCV RBC AUTO: 92.7 FL (ref 80–100)
MONOCYTES ABSOLUTE: 0.6 K/UL (ref 0–1.3)
MONOCYTES RELATIVE PERCENT: 9.3 %
NEUTROPHILS ABSOLUTE: 3.6 K/UL (ref 1.7–7.7)
NEUTROPHILS RELATIVE PERCENT: 59.1 %
PDW BLD-RTO: 14.6 % (ref 12.4–15.4)
PLATELET # BLD: 131 K/UL (ref 135–450)
PMV BLD AUTO: 7.7 FL (ref 5–10.5)
POTASSIUM SERPL-SCNC: 4.1 MMOL/L (ref 3.5–5.1)
RBC # BLD: 3.59 M/UL (ref 4–5.2)
SODIUM BLD-SCNC: 145 MMOL/L (ref 136–145)
TRIGL SERPL-MCNC: 68 MG/DL (ref 0–150)
VLDLC SERPL CALC-MCNC: 14 MG/DL
WBC # BLD: 6.1 K/UL (ref 4–11)

## 2022-09-17 PROCEDURE — 94640 AIRWAY INHALATION TREATMENT: CPT

## 2022-09-17 PROCEDURE — 85025 COMPLETE CBC W/AUTO DIFF WBC: CPT

## 2022-09-17 PROCEDURE — 1200000000 HC SEMI PRIVATE

## 2022-09-17 PROCEDURE — 80048 BASIC METABOLIC PNL TOTAL CA: CPT

## 2022-09-17 PROCEDURE — 94761 N-INVAS EAR/PLS OXIMETRY MLT: CPT

## 2022-09-17 PROCEDURE — 6370000000 HC RX 637 (ALT 250 FOR IP): Performed by: FAMILY MEDICINE

## 2022-09-17 PROCEDURE — 93306 TTE W/DOPPLER COMPLETE: CPT

## 2022-09-17 PROCEDURE — 99232 SBSQ HOSP IP/OBS MODERATE 35: CPT | Performed by: INTERNAL MEDICINE

## 2022-09-17 PROCEDURE — 2580000003 HC RX 258: Performed by: FAMILY MEDICINE

## 2022-09-17 PROCEDURE — 83735 ASSAY OF MAGNESIUM: CPT

## 2022-09-17 PROCEDURE — 80061 LIPID PANEL: CPT

## 2022-09-17 PROCEDURE — 94669 MECHANICAL CHEST WALL OSCILL: CPT

## 2022-09-17 PROCEDURE — 2700000000 HC OXYGEN THERAPY PER DAY

## 2022-09-17 PROCEDURE — 36415 COLL VENOUS BLD VENIPUNCTURE: CPT

## 2022-09-17 PROCEDURE — 6360000002 HC RX W HCPCS: Performed by: FAMILY MEDICINE

## 2022-09-17 PROCEDURE — 94150 VITAL CAPACITY TEST: CPT

## 2022-09-17 RX ORDER — IPRATROPIUM BROMIDE AND ALBUTEROL SULFATE 2.5; .5 MG/3ML; MG/3ML
1 SOLUTION RESPIRATORY (INHALATION)
Status: DISCONTINUED | OUTPATIENT
Start: 2022-09-17 | End: 2022-09-18 | Stop reason: HOSPADM

## 2022-09-17 RX ORDER — FUROSEMIDE 20 MG/1
20 TABLET ORAL DAILY
Status: DISCONTINUED | OUTPATIENT
Start: 2022-09-17 | End: 2022-09-18 | Stop reason: HOSPADM

## 2022-09-17 RX ORDER — GUAIFENESIN 600 MG/1
600 TABLET, EXTENDED RELEASE ORAL 2 TIMES DAILY
Status: DISCONTINUED | OUTPATIENT
Start: 2022-09-17 | End: 2022-09-18 | Stop reason: HOSPADM

## 2022-09-17 RX ADMIN — AMLODIPINE BESYLATE 5 MG: 5 TABLET ORAL at 08:26

## 2022-09-17 RX ADMIN — ROSUVASTATIN CALCIUM 10 MG: 10 TABLET, FILM COATED ORAL at 21:34

## 2022-09-17 RX ADMIN — QUETIAPINE FUMARATE 25 MG: 25 TABLET ORAL at 21:34

## 2022-09-17 RX ADMIN — MEMANTINE HYDROCHLORIDE 10 MG: 10 TABLET ORAL at 08:26

## 2022-09-17 RX ADMIN — Medication 500 MG: at 08:26

## 2022-09-17 RX ADMIN — GUAIFENESIN 600 MG: 600 TABLET ORAL at 21:35

## 2022-09-17 RX ADMIN — Medication 10 ML: at 08:27

## 2022-09-17 RX ADMIN — ISOSORBIDE MONONITRATE 30 MG: 30 TABLET, EXTENDED RELEASE ORAL at 08:26

## 2022-09-17 RX ADMIN — ENOXAPARIN SODIUM 30 MG: 100 INJECTION SUBCUTANEOUS at 21:34

## 2022-09-17 RX ADMIN — MOMETASONE FUROATE AND FORMOTEROL FUMARATE DIHYDRATE 2 PUFF: 200; 5 AEROSOL RESPIRATORY (INHALATION) at 21:24

## 2022-09-17 RX ADMIN — EZETIMIBE 10 MG: 10 TABLET ORAL at 08:26

## 2022-09-17 RX ADMIN — MEMANTINE HYDROCHLORIDE 10 MG: 10 TABLET ORAL at 21:34

## 2022-09-17 RX ADMIN — GUAIFENESIN 600 MG: 600 TABLET ORAL at 15:54

## 2022-09-17 RX ADMIN — DONEPEZIL HYDROCHLORIDE 10 MG: 10 TABLET, FILM COATED ORAL at 21:34

## 2022-09-17 RX ADMIN — ASPIRIN 81 MG 81 MG: 81 TABLET ORAL at 08:26

## 2022-09-17 RX ADMIN — SERTRALINE 75 MG: 50 TABLET, FILM COATED ORAL at 08:26

## 2022-09-17 RX ADMIN — SOTALOL HYDROCHLORIDE 80 MG: 80 TABLET ORAL at 21:34

## 2022-09-17 RX ADMIN — LEVOTHYROXINE SODIUM 150 MCG: 0.07 TABLET ORAL at 06:36

## 2022-09-17 RX ADMIN — MOMETASONE FUROATE AND FORMOTEROL FUMARATE DIHYDRATE 2 PUFF: 200; 5 AEROSOL RESPIRATORY (INHALATION) at 08:11

## 2022-09-17 RX ADMIN — TIOTROPIUM BROMIDE INHALATION SPRAY 2 PUFF: 3.12 SPRAY, METERED RESPIRATORY (INHALATION) at 08:11

## 2022-09-17 RX ADMIN — Medication 500 MG: at 15:54

## 2022-09-17 RX ADMIN — FUROSEMIDE 40 MG: 10 INJECTION, SOLUTION INTRAMUSCULAR; INTRAVENOUS at 08:26

## 2022-09-17 RX ADMIN — LOSARTAN POTASSIUM 100 MG: 100 TABLET, FILM COATED ORAL at 21:34

## 2022-09-17 RX ADMIN — IPRATROPIUM BROMIDE AND ALBUTEROL SULFATE 1 AMPULE: .5; 3 SOLUTION RESPIRATORY (INHALATION) at 21:23

## 2022-09-17 RX ADMIN — Medication 10 ML: at 21:35

## 2022-09-17 RX ADMIN — IPRATROPIUM BROMIDE AND ALBUTEROL SULFATE 1 AMPULE: .5; 3 SOLUTION RESPIRATORY (INHALATION) at 15:21

## 2022-09-17 ASSESSMENT — ENCOUNTER SYMPTOMS
VOMITING: 0
NAUSEA: 0
ABDOMINAL PAIN: 0
ANAL BLEEDING: 0
COUGH: 0
SORE THROAT: 0
SHORTNESS OF BREATH: 1
BACK PAIN: 0
EYE PAIN: 0

## 2022-09-17 ASSESSMENT — PAIN SCALES - GENERAL
PAINLEVEL_OUTOF10: 0

## 2022-09-17 NOTE — PROGRESS NOTES
Pt A&O in the bed. Pt tolerating PO intake well. AM medications administered without complications. Pt has no complaints at this time. Attempted to wean patient off 02. Pt 84% on RA. 1L 02 NC placed on patient. 02 92%. Will continue to monitor. Call light within reach. Able to make needs known. Fall precautions in place.  Electronically signed by Kwasi Alanis RN on 9/17/2022 at 5:45 PM

## 2022-09-17 NOTE — PLAN OF CARE
Problem: Discharge Planning  Goal: Discharge to home or other facility with appropriate resources  9/17/2022 0319 by Balta Curtis RN  Outcome: Progressing  Flowsheets (Taken 9/16/2022 2013)  Discharge to home or other facility with appropriate resources:   Identify barriers to discharge with patient and caregiver   Arrange for needed discharge resources and transportation as appropriate   Identify discharge learning needs (meds, wound care, etc)  9/16/2022 1837 by Kaiden Anthony RN  Flowsheets (Taken 9/16/2022 1743)  Discharge to home or other facility with appropriate resources:   Identify barriers to discharge with patient and caregiver   Arrange for needed discharge resources and transportation as appropriate   Identify discharge learning needs (meds, wound care, etc)   Arrange for interpreters to assist at discharge as needed   Refer to discharge planning if patient needs post-hospital services based on physician order or complex needs related to functional status, cognitive ability or social support system     Problem: Skin/Tissue Integrity  Goal: Absence of new skin breakdown  Description: 1. Monitor for areas of redness and/or skin breakdown  2. Assess vascular access sites hourly  3. Every 4-6 hours minimum:  Change oxygen saturation probe site  4. Every 4-6 hours:  If on nasal continuous positive airway pressure, respiratory therapy assess nares and determine need for appliance change or resting period. 9/17/2022 0319 by Balta Curtis RN  Outcome: Progressing  9/16/2022 1837 by Kaiden Anthony RN  Note: Patient skin condition and mucus membrane integrity remain unchanged during this shift. Skin breakdown prevention interventions are in place. Will continue to monitor and assess.         Problem: ABCDS Injury Assessment  Goal: Absence of physical injury  9/17/2022 0319 by Balta Curtis RN  Outcome: Progressing  9/16/2022 1837 by Kaiden Anthony RN  Flowsheets (Taken 9/16/2022 1837)  Absence of Physical Injury: Implement safety measures based on patient assessment     Problem: Respiratory - Adult  Goal: Achieves optimal ventilation and oxygenation  9/17/2022 0319 by Carmen Garcia RN  Outcome: Progressing  Flowsheets (Taken 9/16/2022 2013)  Achieves optimal ventilation and oxygenation:   Assess for changes in respiratory status   Assess for changes in mentation and behavior   Position to facilitate oxygenation and minimize respiratory effort   Oxygen supplementation based on oxygen saturation or arterial blood gases  9/16/2022 1837 by Janay Mcmanus RN  Flowsheets (Taken 9/16/2022 1837)  Achieves optimal ventilation and oxygenation:   Assess for changes in respiratory status   Assess for changes in mentation and behavior   Position to facilitate oxygenation and minimize respiratory effort   Oxygen supplementation based on oxygen saturation or arterial blood gases   Initiate smoking cessation protocol as indicated   Encourage broncho-pulmonary hygiene including cough, deep breathe, incentive spirometry   Assess the need for suctioning and aspirate as needed   Assess and instruct to report shortness of breath or any respiratory difficulty   Respiratory therapy support as indicated     Problem: Cardiovascular - Adult  Goal: Maintains optimal cardiac output and hemodynamic stability  9/17/2022 0319 by Carmen Garcia RN  Outcome: Progressing  Flowsheets (Taken 9/16/2022 2013)  Maintains optimal cardiac output and hemodynamic stability:   Monitor blood pressure and heart rate   Monitor urine output and notify Licensed Independent Practitioner for values outside of normal range   Assess for signs of decreased cardiac output   Administer vasoactive medications as ordered   Administer fluid and/or volume expanders as ordered  9/16/2022 1837 by Janay Mcmanus RN  Flowsheets (Taken 9/16/2022 1837)  Maintains optimal cardiac output and hemodynamic stability:   Monitor blood pressure and heart rate   Monitor urine output and notify Licensed Independent Practitioner for values outside of normal range   Assess for signs of decreased cardiac output   Administer fluid and/or volume expanders as ordered   Administer vasoactive medications as ordered  Goal: Absence of cardiac dysrhythmias or at baseline  9/17/2022 0319 by Ashwin Stroud RN  Outcome: Progressing  4 H Ming Morrow (Taken 9/16/2022 2013)  Absence of cardiac dysrhythmias or at baseline:   Monitor cardiac rate and rhythm   Assess for signs of decreased cardiac output  9/16/2022 1837 by Simone Patel RN  Flowsheets (Taken 9/16/2022 1837)  Absence of cardiac dysrhythmias or at baseline:   Monitor cardiac rate and rhythm   Assess for signs of decreased cardiac output   Administer antiarrhythmia medication and electrolyte replacement as ordered     Problem: Metabolic/Fluid and Electrolytes - Adult  Goal: Electrolytes maintained within normal limits  9/17/2022 0319 by Ashwin Stroud RN  Outcome: Progressing  Flowsheets (Taken 9/16/2022 2013)  Electrolytes maintained within normal limits:   Monitor labs and assess patient for signs and symptoms of electrolyte imbalances   Administer electrolyte replacement as ordered   Monitor response to electrolyte replacements, including repeat lab results as appropriate   Fluid restriction as ordered   Instruct patient on fluid and nutrition restrictions as appropriate  9/16/2022 1837 by Simone Patel RN  Flowsheets (Taken 9/16/2022 1837)  Electrolytes maintained within normal limits:   Monitor labs and assess patient for signs and symptoms of electrolyte imbalances   Administer electrolyte replacement as ordered   Monitor response to electrolyte replacements, including repeat lab results as appropriate   Fluid restriction as ordered   Instruct patient on fluid and nutrition restrictions as appropriate  Goal: Hemodynamic stability and optimal renal function maintained  9/17/2022 0319 by Ashwin Stroud RN  Outcome: Progressing  Flowsheets (Taken 9/16/2022 2013)  Hemodynamic stability and optimal renal function maintained:   Monitor labs and assess for signs and symptoms of volume excess or deficit   Monitor intake, output and patient weight   Monitor urine specific gravity, serum osmolarity and serum sodium as indicated or ordered   Monitor response to interventions for patient's volume status, including labs, urine output, blood pressure (other measures as available)   Encourage oral intake as appropriate   Instruct patient on fluid and nutrition restrictions as appropriate  9/16/2022 1837 by Luisa Bruner RN  Flowsheets (Taken 9/16/2022 1837)  Hemodynamic stability and optimal renal function maintained:   Monitor labs and assess for signs and symptoms of volume excess or deficit   Monitor intake, output and patient weight   Monitor urine specific gravity, serum osmolarity and serum sodium as indicated or ordered   Monitor response to interventions for patient's volume status, including labs, urine output, blood pressure (other measures as available)   Encourage oral intake as appropriate   Instruct patient on fluid and nutrition restrictions as appropriate     Problem: Safety - Adult  Goal: Free from fall injury  9/17/2022 0319 by Belinda Cote RN  Outcome: Progressing  9/16/2022 1837 by Luisa Bruner RN  Flowsheets (Taken 9/16/2022 1837)  Free From Fall Injury: Instruct family/caregiver on patient safety

## 2022-09-17 NOTE — CONSULTS
Brief Nutrition Note     Consult for diet education \"HF diet guidelines\". Pt doing hygiene care during visit. Reports she leaves with daughter who does cooking and shopping. Left handout at bedside. Will follow-up on Monday for education with daughter.       Electronically signed by Acosta Gage RD, LD on 9/17/2022 at 11:36 AM

## 2022-09-17 NOTE — CONSULTS
[address]                                  CONSULTATION    PATIENT NAME: Trip Paniagua                  :        1932  MED REC NO:   [id number]                          ROOM:       [id number]  ACCOUNT NO:   [de-identified]                           ADMIT DATE: 2022  PROVIDER:     Yolande Bal MD    CONSULT DATE:  2022    CARDIAC CONSULTATION NOTE    HISTORY OF PRESENT ILLNESS:  This is a pleasant 77-year-old female who  came to [facility] [place] with a sudden onset of shortness of breath. She has a previous history of COPD, but she got extremely short of  breath after she was coming out of the shower. She did use a nebulizer  treatment, but her symptoms persisted, and she came to the emergency  room. She was found to have her oxygen dropping to 60%, and she was  admitted to the hospital.  Her x-ray has showed no obvious pulmonary  edema. Her BNP was elevated. She denied any chest tightness. She had  no fever, chills, or rigors. No palpitation. PAST MEDICAL HISTORY:  1. History of coronary artery disease. She has been followed by the  [facility]. She had stents many years ago in her left  anterior descending artery. 2.  History of diastolic heart failure. 3.  Severe COPD. 4.  History of paroxysmal atrial fibrillation. FAMILY HISTORY:  Negative for any early premature atherosclerosis. SOCIAL HISTORY:  She lives at home with her daughter. No history of  smoking or alcohol abuse. MEDICINE ALLERGIES:  Have been reviewed. REVIEW OF SYSTEMS:  Please see HPI. All other systems are reviewed and  they are negative. PHYSICAL EXAMINATION:  CONSTITUTIONAL:  She is alert and oriented. VITAL SIGNS:  Her pulse is 70 and regular, blood pressure 158/67,  respirations 14, oxygen saturation 96% currently. She was placed on a  BiPAP briefly. NECK:  Supple. No JVD. No thyromegaly. No lymphadenopathy. No  carotid bruits heard. CHEST:  Lungs revealed bibasilar scattered crackles. No expiratory  wheezes appreciated. CARDIOVASCULAR:  Regular rate and rhythm. I am unable to experience any  gallop, murmur, or rub. ABDOMEN:  Soft and nontender. Bowel sounds are present. EXTREMITIES:  No edema. NEUROLOGIC:  Alert, oriented. Cranial nerves II through XII intact. No  focal deficits. SKIN:  No rashes. LABORATORY DATA:  Sodium is 146, potassium 4.7, chloride 105, bicarb 35,  BUN is 30, creatinine 1.1. ProBNP was 6402. Liver functions are  normal.  TSH is 1.13. Glucose is normal.  The white count 8000,  hemoglobin 13.3, hematocrit 39.9. Platelets are normal.  Blood gas  showed pH of 7.33, pCO2 60, pO2 is 87.9. Chest x-ray shows no acute process. EKG shows sinus rhythm, left  bundle-branch block. IMPRESSION:  1. This is an 44-year-old female who has presented with sudden onset of  shortness of breath and appeared to have acute-on-chronic hypoxic  respiratory failure, etiology needs to be determined. She has elevated  BNP, but surprisingly her chest x-ray is clear. Congestive heart  failure is a strong possibility, but we need to rule out pulmonary  embolism, which can certainly cause hypoxic respiratory failure and have  elevated BNP. 2.  Coronary artery disease with remote stenting. The patient is  currently stable. 3.  Hypertension. Blood pressure is mildly elevated. Goal is less than  130/80. 4.  Remote history of paroxysmal atrial flutter, not on anticoagulation  therapy for unclear reason. 5.  History of COPD. RECOMMENDATIONS:  1.  I will order a CAT scan with contrast to rule out PE.  2.  We will continue the patient on IV Lasix for now. 3.  I will increase the patient's Imdur to 60 mg daily for a better  blood pressure control and preload reduction. 4.  We will review the patient's echocardiogram, which has been ordered.     I appreciate the opportunity to participate in the care of this pleasant  female.     With warm regards,        Sabra Pina MD    D: 09/16/2022 15:42:31       T: 09/16/2022 18:24:05     [email or web address]  Job#: [id number]     Doc#: [id number]    CC:

## 2022-09-17 NOTE — PROGRESS NOTES
Hospitalist Progress Note    CC: Acute respiratory failure with hypoxia and hypercapnia ([address]      Admit date: 9/16/2022  Days in hospital:  1    Subjective/interval history: Pt S/E. Ct yesterday shows new lung nodules and a renal mass. She tells me she doesn't want to do anything about it. She wants to go home, but will wait to see if she can come off o2. O2 status: 84% room air -> 1 L 93%    ROS:   Pertinent items are noted in HPI. Objective:    /60   Pulse 50   Temp 97.5 °F (36.4 °C) (Oral)   Resp 17   Ht 5' 2\" (1.575 m)   Wt 133 lb 13.1 oz (60.7 kg)   SpO2 98%   BMI 24.48 kg/m²     Gen: sitting up washing her hair in the bathroom, NAD  HEENT: NC/AT, moist mucous membranes, no oropharyngeal erythema or exudate  Neck: supple, trachea midline  Heart: Normal s1/s2, RRR, no murmurs, gallops, or rubs. Lungs:  no wheezing, mild rales in lung bases, no rhonchi, no use of accessory muscles  Abd: bowel sounds present, soft, nontender, nondistended, no masses  Extrem: No clubbing, cyanosis, no edema  Skin: no rashes or lesions  Psych: A & O x3, affect appropriate  Neuro: grossly intact, moves all four extremities spontaneously.   Cap refill: +2 sec    Medications:  Scheduled Meds:   levothyroxine  150 mcg Oral Daily    isosorbide mononitrate  30 mg Oral Daily    sotalol  80 mg Oral BID    ezetimibe  10 mg Oral Daily    calcium elemental  500 mg Oral BID WC    sertraline  75 mg Oral Daily    donepezil  10 mg Oral Nightly    memantine  10 mg Oral BID    aspirin  81 mg Oral Daily    rosuvastatin  10 mg Oral Nightly    QUEtiapine  25 mg Oral Nightly    amLODIPine  5 mg Oral Daily    losartan  100 mg Oral Nightly    furosemide  40 mg IntraVENous Daily    sodium chloride flush  5-40 mL IntraVENous 2 times per day    enoxaparin  30 mg SubCUTAneous Nightly    mometasone-formoterol  2 puff Inhalation BID    tiotropium  2 puff Inhalation Daily       PRN Meds:  sodium chloride flush, sodium chloride, ondansetron **OR** ondansetron, polyethylene glycol, acetaminophen **OR** acetaminophen, perflutren lipid microspheres    IV:   sodium chloride           Intake/Output Summary (Last 24 hours) at 9/17/2022 0813  Last data filed at 9/16/2022 2131  Gross per 24 hour   Intake 540 ml   Output 650 ml   Net -110 ml       Results:  CBC:   Recent Labs     09/16/22 0916 09/17/22 0628   WBC 8.0 6.1   HGB 13.3 11.2*   HCT 39.9 33.3*   MCV 93.9 92.7    131*     BMP:   Recent Labs     09/16/22  0916 09/17/22 0628   * 145   K 4.7 4.1    103   CO2 35* 32   BUN 30* 31*   CREATININE 1.1 1.1     Mag: No results for input(s): MAG in the last 72 hours. Phos: No results found for: PHOS  No results found for: GLU    LIVER PROFILE:   Recent Labs     09/16/22 0916   AST 27   ALT 17   BILITOT 0.8   ALKPHOS 58     PT/INR: No results for input(s): PROTIME, INR in the last 72 hours. APTT: No results for input(s): APTT in the last 72 hours. UA:No results for input(s): NITRITE, COLORU, PHUR, LABCAST, WBCUA, RBCUA, MUCUS, TRICHOMONAS, YEAST, BACTERIA, CLARITYU, SPECGRAV, LEUKOCYTESUR, UROBILINOGEN, BILIRUBINUR, BLOODU, GLUCOSEU, AMORPHOUS in the last 72 hours. Invalid input(s): [employer] input(s): ABG  Lab Results   Component Value Date    CALCIUM 9.5 09/17/2022       Assessment:    Principal Problem:    Acute respiratory failure with hypoxia and hypercapnia (HCC)  Active Problems:    Coronary artery disease involving native coronary artery of native heart without angina pectoris    Primary hypertension  Resolved Problems:    * No resolved hospital problems. [facility] course: a 80 y.o. female atrial fib, mild copd and mild hfpef who presents to [facility] with sob. Pre ems she was 60 % on room air but in the ed she was in the high 90s on 2L. She was given lasix 20 mg iv     ED workup  Vitals: 100% on 3L -> down to 1L.    Pertinent labs: bnp 6400, abg 7.33/60.6/87.9  Imaging: cxr: no overwhelming evidence of volume overload or infectious process    Plan:  Acute hypoxic and hypercarbic respiratory failure, POA - improving  - with criteria: < 90% on RA, RR> 18  - supplemental oxygen as necessary to keep SaO2 > 90%; not on o2 at baseline  - abg with pH 7.33, pco2 60.6, po2 88  - ct chest with new lung nodules and a rt renal mass     Renal mass  Lung nodules  - consulted oncology  - pt doesn't want to work up or treat at this point    Acute on chronic diastolic CHF without exacerbation  - cxr without signs of volume overload  - last ECHO: 8/2020 lvef 50-55%, borderline global hypokinesis of the lv, normal rv function, size  - bnp 6400, higher than her baseline, last was 400  - home lasix 40 mg po daily  - cont BB, ACEi/ARB/arni  - daily wts - 130-133 lbs  - I/Os  - consult CHF RN  - consult cardiology     COPD, mild  - fev1 64%  - cont home inhalers     Chronic conditions - continue home meds unless otherwise stated  Covid+ 4/2022  Cad  Paf  Htn  paf, not on ac - one episode in 2013    Code status:  full  DVT prophylaxis: [x] Lovenox  [] SQ Heparin  [] SCDs  [] warfarin/oral direct thrombin inhibitor [] Encourage ambulation    Disposition:  [] Home [] Rehab [] Psych [] SNF  [] LTAC  [] Transfer to ICU  [] Transfer to PCU [] Other: in pt      Electronically signed by Sheeba Linares DO on 9/17/2022 at 8:13 AM

## 2022-09-17 NOTE — PLAN OF CARE
Problem: Discharge Planning  Goal: Discharge to home or other facility with appropriate resources  Outcome: Progressing  Flowsheets (Taken 9/17/2022 1746)  Discharge to home or other facility with appropriate resources: Identify barriers to discharge with patient and caregiver     Problem: Skin/Tissue Integrity  Goal: Absence of new skin breakdown  Description: 1. Monitor for areas of redness and/or skin breakdown  2. Assess vascular access sites hourly  3. Every 4-6 hours minimum:  Change oxygen saturation probe site  4. Every 4-6 hours:  If on nasal continuous positive airway pressure, respiratory therapy assess nares and determine need for appliance change or resting period. Outcome: Progressing  Note: Pt shows no new signs of skin integrity. Will monitor and encourage patient to turn and reposition every two hours.       Problem: ABCDS Injury Assessment  Goal: Absence of physical injury  Outcome: Progressing  Flowsheets (Taken 9/17/2022 1746)  Absence of Physical Injury: Implement safety measures based on patient assessment     Problem: Respiratory - Adult  Goal: Achieves optimal ventilation and oxygenation  Outcome: Progressing  Flowsheets (Taken 9/17/2022 1746)  Achieves optimal ventilation and oxygenation: Assess for changes in respiratory status     Problem: Cardiovascular - Adult  Goal: Maintains optimal cardiac output and hemodynamic stability  Outcome: Progressing  Flowsheets (Taken 9/17/2022 1746)  Maintains optimal cardiac output and hemodynamic stability: Monitor blood pressure and heart rate  Goal: Absence of cardiac dysrhythmias or at baseline  Outcome: Progressing  Flowsheets (Taken 9/17/2022 1746)  Absence of cardiac dysrhythmias or at baseline: Monitor cardiac rate and rhythm     Problem: Metabolic/Fluid and Electrolytes - Adult  Goal: Electrolytes maintained within normal limits  Outcome: Progressing  Flowsheets (Taken 9/17/2022 1746)  Electrolytes maintained within normal limits: Monitor labs and assess patient for signs and symptoms of electrolyte imbalances  Goal: Hemodynamic stability and optimal renal function maintained  Outcome: Progressing  Flowsheets (Taken 9/17/2022 1746)  Hemodynamic stability and optimal renal function maintained: Monitor labs and assess for signs and symptoms of volume excess or deficit     Problem: Safety - Adult  Goal: Free from fall injury  Outcome: Progressing  Flowsheets (Taken 9/17/2022 1746)  Free From Fall Injury: Instruct family/caregiver on patient safety     Problem: Pain  Goal: Verbalizes/displays adequate comfort level or baseline comfort level  Outcome: Progressing  Flowsheets (Taken 9/17/2022 1746)  Verbalizes/displays adequate comfort level or baseline comfort level:   Encourage patient to monitor pain and request assistance   Administer analgesics based on type and severity of pain and evaluate response   Assess pain using appropriate pain scale   Implement non-pharmacological measures as appropriate and evaluate response

## 2022-09-17 NOTE — CONSULTS
[facility]    Consult Note      Requesting Physician: viktoria  CHIEF COMPLAINT: sob      HISTORY OF PRESENT ILLNESS:      Ms. Rekha Bird  is a 80 y.o. 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     Past Medical History:        Diagnosis Date    CAD (coronary artery disease)     Hypertension      Past Surgical History:        Procedure Laterality Date    CORONARY ANGIOPLASTY WITH STENT PLACEMENT      1998       Current Medications:    Current Facility-Administered Medications: levothyroxine (SYNTHROID) tablet 150 mcg, 150 mcg, Oral, Daily  isosorbide mononitrate (IMDUR) extended release tablet 30 mg, 30 mg, Oral, Daily  sotalol (BETAPACE) tablet 80 mg, 80 mg, Oral, BID  ezetimibe (ZETIA) tablet 10 mg, 10 mg, Oral, Daily  calcium elemental (OSCAL) tablet 500 mg, 500 mg, Oral, BID WC  sertraline (ZOLOFT) tablet 75 mg, 75 mg, Oral, Daily  donepezil (ARICEPT) tablet 10 mg, 10 mg, Oral, Nightly  memantine (NAMENDA) tablet 10 mg, 10 mg, Oral, BID  aspirin chewable tablet 81 mg, 81 mg, Oral, Daily  rosuvastatin (CRESTOR) tablet 10 mg, 10 mg, Oral, Nightly  QUEtiapine (SEROQUEL) tablet 25 mg, 25 mg, Oral, Nightly  amLODIPine (NORVASC) tablet 5 mg, 5 mg, Oral, Daily  losartan (COZAAR) tablet 100 mg, 100 mg, Oral, Nightly  furosemide (LASIX) injection 40 mg, 40 mg, IntraVENous, Daily  sodium chloride flush 0.9 % injection 5-40 mL, 5-40 mL, IntraVENous, 2 times per day  sodium chloride flush 0.9 % injection 5-40 mL, 5-40 mL, IntraVENous, PRN  0.9 % sodium chloride infusion, , IntraVENous, PRN  ondansetron (ZOFRAN-ODT) disintegrating tablet 4 mg, 4 mg, Oral, Q8H PRN **OR** ondansetron (ZOFRAN) injection 4 mg, 4 mg, IntraVENous, Q6H PRN  polyethylene glycol (GLYCOLAX) packet 17 g, 17 g, Oral, Daily PRN  acetaminophen (TYLENOL) tablet 650 mg, 650 mg, Oral, Q6H PRN **OR** acetaminophen (TYLENOL) suppository 650 mg, 650 mg, Rectal, Q6H PRN  perflutren lipid microspheres (DEFINITY) injection 1.65 mg, 1.5 mL, IntraVENous, ONCE PRN  enoxaparin Sodium (LOVENOX) injection 30 mg, 30 mg, SubCUTAneous, Nightly  mometasone-formoterol (DULERA) 200-5 MCG/ACT inhaler 2 puff, 2 puff, Inhalation, BID  tiotropium (SPIRIVA RESPIMAT) 2.5 MCG/ACT inhaler 2 puff, 2 puff, Inhalation, Daily  Allergies:  Patient has no known allergies. Social History:      Social History     Socioeconomic History    Marital status:       Spouse name: Not on file    Number of children: Not on file    Years of education: Not on file    Highest education level: Not on file   Occupational History    Not on file   Tobacco Use    Smoking status: Former     Types: Cigarettes    Smokeless tobacco: Never    Tobacco comments:     quit 45 years ago - socially   Vaping Use    Vaping Use: Never used   Substance and Sexual Activity    Alcohol use: Yes     Comment: socially    Drug use: No    Sexual activity: Not on file   Other Topics Concern    Not on file   Social History Narrative    Not on file     Social Determinants of Health     Financial Resource Strain: Not on file   Food Insecurity: Not on file   Transportation Needs: Not on file   Physical Activity: Not on file   Stress: Not on file   Social Connections: Not on file   Intimate Partner Violence: Not on file   Housing Stability: Not on file          Family History:         Problem Relation Age of Onset    COPD Mother      REVIEW OF SYSTEMS:    ROS per the HPI Otherwise  10 point ROS negative     PHYSICAL EXAM:      Vitals:  /60   Pulse 50   Temp 97.5 °F (36.4 °C) (Oral)   Resp 16   Ht 5' 2\" (1.575 m)   Wt 133 lb 13.1 oz (60.7 kg)   SpO2 93%   BMI 24.48 kg/m²     CONSTITUTIONAL:  awake, alert, cooperative, no apparent distress, and appears stated age NAD  EYES:  pupils equal, round and reactive to light, extra ocular muscles intact, sclera clear, conjunctiva normal  NECK:  Supple, symmetrical, trachea midline, no adenopathy, thyroid symmetric, not enlarged and no tenderness, skin normal  HEMATOLOGIC/LYMPHATICS:  no cervical lymphadenopathy, no supraclavicular lymphadenopathy,   LUNGS:  diminished but no rhonchi crackles   CARDIOVASCULAR:  , regular rate and rhythm, normal S1 and S2, no S3 or S4, and no murmur noted  ABDOMEN:  No scars, normal bowel sounds, soft, non-distended, non-tender, no masses palpated, no hepatosplenomegally      MUSCULOSKELETAL:  There is no redness, warmth, or swelling of the joints. Full range of motion noted. =.  NEUROLOGIC:  Awake, alert, oriented to name, place and time. Cranial nerves II-XII are grossly intact. y. SKIN:  no bruising or bleeding      DATA:    PT/INR:    Recent Labs     09/16/22 0916   PROT 7.2     PTT:  No results for input(s): APTT in the last 72 hours.   CMP:    Lab Results   Component Value Date/Time     09/17/2022 06:28 AM    K 4.1 09/17/2022 06:28 AM    K 4.7 09/16/2022 09:16 AM     09/17/2022 06:28 AM    CO2 32 09/17/2022 06:28 AM    BUN 31 09/17/2022 06:28 AM    PROT 7.2 09/16/2022 09:16 AM     Magnesium:    Lab Results   Component Value Date/Time    MG 2.30 09/17/2022 06:28 AM     Phosphorus:  No components found for: PO4  Calcium:  No results found for: CA  CBC:    Lab Results   Component Value Date/Time    WBC 6.1 09/17/2022 06:28 AM    RBC 3.59 09/17/2022 06:28 AM    HGB 11.2 09/17/2022 06:28 AM    HCT 33.3 09/17/2022 06:28 AM    MCV 92.7 09/17/2022 06:28 AM    RDW 14.6 09/17/2022 06:28 AM     09/17/2022 06:28 AM     DIFF:    Lab Results   Component Value Date/Time    MCV 92.7 09/17/2022 06:28 AM    RDW 14.6 09/17/2022 06:28 AM      LDH:  @labcrnt(LDH)@  Uric Acid:  @labcrnt(URIC)@    Radiology Review: CT CHEST W CONTRAST    Result Date: 9/16/2022  EXAMINATION: CT OF THE CHEST WITH CONTRAST 9/16/2022 5:49 pm TECHNIQUE: CT of the chest was performed with the administration of intravenous contrast. Multiplanar reformatted images are provided for review. Automated exposure control, iterative reconstruction, and/or weight based adjustment of the mA/kV was utilized to reduce the radiation dose to as low as reasonably achievable. COMPARISON: May 8, 2017 HISTORY: ORDERING SYSTEM PROVIDED HISTORY: hypoxia. rule out PE. TECHNOLOGIST PROVIDED HISTORY: Reason for exam:->hypoxia. rule out PE. Reason for Exam: hypoxia. rule out PE FINDINGS: Mediastinum: Thoracic aorta, pulmonary arteries, heart and pericardium appear stable. No evidence of acute pulmonary embolism. No evidence of aortic aneurysm or dissection. No evidence of significant lymphadenopathy. Lungs/pleura: Faint ground-glass opacities in the upper lobes. Tiny 2 mm pulmonary nodule in the right upper lobe posteriorly. 2 pleural base nodules in the left upper lobe posterolaterally, one measuring 4.2 mm, and the 2nd 3.4 mm. Additional nodule seen in the superior segment of left lower lobe 1 adjacent to each other 1 measuring 5.8 mm, and the 2nd 6.1 mm. These nodules were not previously seen. Additional smaller nodules are seen in the right lower lobe. A 5.3 mm nodule seen posterolaterally in the right lower lobe. A pleural based irregular lesion seen in the right lower lobe measuring 1.4 x 1.1 cm which was not previously seen. This may represent atelectasis. Atelectatic changes also seen in the left lower lobe. No active pleural disease.  Upper Abdomen: A solid dense renal nodule seen in the right kidney near the upper pole measuring 1. 5 cm. This may represent renal cancer. Mild fatty liver. Soft Tissues/Bones: Moderate multilevel degenerative disc disease. No evidence of focal destructive changes. 1. No evidence of acute pulmonary embolism or acute aortic disease. 2. Increase in number of multiple nodules in the upper and lower lobes. This is suspicious for possible metastatic disease. 3. Faint ground-glass opacities in the upper lobes and to lesser degree lower lobes are nonspecific. No active pleural disease. 4. Mild atelectatic changes both lower lobes. No active pleural disease. 5. A right renal mass measuring 1.5 cm has the appearance of likely renal cancer. RECOMMENDATIONS: 1. Workup for possible metastatic disease. 2. Workup of the right renal lesion for possible renal cancer. XR CHEST PORTABLE    Result Date: 9/16/2022  EXAMINATION: ONE XRAY VIEW OF THE CHEST 9/16/2022 9:16 am COMPARISON: None. HISTORY: ORDERING SYSTEM PROVIDED HISTORY: SOB TECHNOLOGIST PROVIDED HISTORY: Reason for exam:->SOB Reason for Exam: SOB FINDINGS: The lung fields are clear. The heart is slightly enlarged. There is slight calcification of the aortic arch. There is no pulmonary vascular congestion nor evidence of a pleural abnormality. No acute process.          Problem List  Patient Active Problem List   Diagnosis    COPD, mild (HCC)    Diastolic dysfunction with chronic heart failure (HCC)    Other allergic rhinitis    Acute respiratory failure with hypoxia and hypercapnia (HCC)    Coronary artery disease involving native coronary artery of native heart without angina pectoris    Primary hypertension       IMPRESSION/RECOMMENDATIONS:  INcidental renal mass found on the right along with some nodules all mm in size with one being at a cm  My overall impression is that the renal lesion and the lung are not likely related  The small nature of the renal lesion to cause mets would be quite rare-usually a large tumor in the kidney would be more likely assocaited with mets than a small one   Many small renal tumors never cause clinical problems for people and observation of these is very reasonable and Id lean to that -I would not biopsy or treat -she could be offered a ct scan to watch -as many of these are non aggressive    Lung nodules-again I doubt this is mets from the kidney  The lesions are all mm in size except one  THe pt is not keen on doing a biopsy in this hosp stay -I do not disagree with her   She is asymptomatic from this -probably-I doubt her sob is from this    I would be more than willing to repeat a chest c t in a few week 6-8 and if worse consider pet   She is likely to agree with this     Urology consulted as well for the renal lesion -as a general rule I dont think they will disagree with this but will await their note     I cancelled the ir biopsy after discusssing with pt     Thank you for the consultation.   Will follow with you    Laurita Virk MD, MD  Patient Education/Coordination of care:

## 2022-09-17 NOTE — ACP (ADVANCE CARE PLANNING)
Advance Care Planning     Advance Care Planning Inpatient Note  [facility] Department    Today's Date: 2022  Unit: [facility]    Received request from IDT Member. Upon review of chart and communication with care team, request Health Care Provider's clarification of patient's decision making capacity. . Patient was/were present in the room during visit. Goals of ACP Conversation:  Discuss advance care planning documents    Health Care Decision Makers:       Primary Decision MakerVdivya Diaz - Child - [phone]    Primary Decision Maker: Saturnino Mariee - Child - [phone]  Summary:  [address] 46 (Patient Wishes):  Patient said she has both living will and healthcare power of  at home      Assessment:  Patient is awake and alert, sitting in chair, no visitors present at this time. Patient's  Carlos Enrique Boswell  five years ago; they have seven adult children living in multiple states. Patient lives with her daughter Nancy Self, but identified her other two daughters, Amy Doyle and Aminah Zepeda, as her healthcare oquendo of . Patient agreed to have her daughter bring in the advance directives that she has at home.     Interventions:  Requested patient/family to submit existing document for our records: Healthcare Power of /Advance Directive Appointment of [email or web address] Directive  Discussed and provided education on state decision maker hierarchy    Care Preferences Communicated:   No    Outcomes/Plan:  ACP Discussion: Completed    Electronically signed by Saray Blackwell, [address] on 2022 at 12:57 PM

## 2022-09-18 VITALS
HEART RATE: 56 BPM | OXYGEN SATURATION: 92 % | RESPIRATION RATE: 18 BRPM | DIASTOLIC BLOOD PRESSURE: 67 MMHG | SYSTOLIC BLOOD PRESSURE: 140 MMHG | TEMPERATURE: 98.1 F | BODY MASS INDEX: 25.15 KG/M2 | HEIGHT: 62 IN | WEIGHT: 136.69 LBS

## 2022-09-18 PROBLEM — R91.8 LUNG MASS: Status: ACTIVE | Noted: 2022-09-18

## 2022-09-18 PROBLEM — N28.89 RENAL MASS, RIGHT: Status: ACTIVE | Noted: 2022-09-18

## 2022-09-18 LAB
ANION GAP SERPL CALCULATED.3IONS-SCNC: 8 MMOL/L (ref 3–16)
BUN BLDV-MCNC: 37 MG/DL (ref 7–20)
CALCIUM SERPL-MCNC: 9.5 MG/DL (ref 8.3–10.6)
CHLORIDE BLD-SCNC: 104 MMOL/L (ref 99–110)
CO2: 33 MMOL/L (ref 21–32)
CREAT SERPL-MCNC: 1.2 MG/DL (ref 0.6–1.2)
GFR AFRICAN AMERICAN: 51
GFR NON-AFRICAN AMERICAN: 42
GLUCOSE BLD-MCNC: 90 MG/DL (ref 70–99)
MAGNESIUM: 2.2 MG/DL (ref 1.8–2.4)
POTASSIUM SERPL-SCNC: 3.5 MMOL/L (ref 3.5–5.1)
SODIUM BLD-SCNC: 145 MMOL/L (ref 136–145)

## 2022-09-18 PROCEDURE — 80048 BASIC METABOLIC PNL TOTAL CA: CPT

## 2022-09-18 PROCEDURE — 2700000000 HC OXYGEN THERAPY PER DAY

## 2022-09-18 PROCEDURE — 2580000003 HC RX 258: Performed by: FAMILY MEDICINE

## 2022-09-18 PROCEDURE — 94669 MECHANICAL CHEST WALL OSCILL: CPT

## 2022-09-18 PROCEDURE — 6370000000 HC RX 637 (ALT 250 FOR IP): Performed by: FAMILY MEDICINE

## 2022-09-18 PROCEDURE — 36415 COLL VENOUS BLD VENIPUNCTURE: CPT

## 2022-09-18 PROCEDURE — 94761 N-INVAS EAR/PLS OXIMETRY MLT: CPT

## 2022-09-18 PROCEDURE — 83735 ASSAY OF MAGNESIUM: CPT

## 2022-09-18 PROCEDURE — 94680 O2 UPTK RST&XERS DIR SIMPLE: CPT

## 2022-09-18 PROCEDURE — 94640 AIRWAY INHALATION TREATMENT: CPT

## 2022-09-18 RX ORDER — FUROSEMIDE 20 MG/1
20 TABLET ORAL DAILY
Qty: 60 TABLET | Refills: 3 | Status: SHIPPED | OUTPATIENT
Start: 2022-09-19 | End: 2022-09-18 | Stop reason: SDUPTHER

## 2022-09-18 RX ORDER — FUROSEMIDE 20 MG/1
40 TABLET ORAL DAILY
Qty: 60 TABLET | Refills: 0 | Status: SHIPPED | OUTPATIENT
Start: 2022-09-19

## 2022-09-18 RX ORDER — IPRATROPIUM BROMIDE AND ALBUTEROL SULFATE 2.5; .5 MG/3ML; MG/3ML
3 SOLUTION RESPIRATORY (INHALATION)
Qty: 360 ML | Refills: 3 | Status: SHIPPED | OUTPATIENT
Start: 2022-09-18

## 2022-09-18 RX ADMIN — SERTRALINE 75 MG: 50 TABLET, FILM COATED ORAL at 09:08

## 2022-09-18 RX ADMIN — MOMETASONE FUROATE AND FORMOTEROL FUMARATE DIHYDRATE 2 PUFF: 200; 5 AEROSOL RESPIRATORY (INHALATION) at 07:56

## 2022-09-18 RX ADMIN — ASPIRIN 81 MG 81 MG: 81 TABLET ORAL at 09:08

## 2022-09-18 RX ADMIN — EZETIMIBE 10 MG: 10 TABLET ORAL at 09:08

## 2022-09-18 RX ADMIN — LEVOTHYROXINE SODIUM 150 MCG: 0.07 TABLET ORAL at 06:44

## 2022-09-18 RX ADMIN — FUROSEMIDE 20 MG: 20 TABLET ORAL at 09:08

## 2022-09-18 RX ADMIN — Medication 10 ML: at 09:08

## 2022-09-18 RX ADMIN — ISOSORBIDE MONONITRATE 30 MG: 30 TABLET, EXTENDED RELEASE ORAL at 09:08

## 2022-09-18 RX ADMIN — Medication 500 MG: at 09:08

## 2022-09-18 RX ADMIN — GUAIFENESIN 600 MG: 600 TABLET ORAL at 09:08

## 2022-09-18 RX ADMIN — IPRATROPIUM BROMIDE AND ALBUTEROL SULFATE 1 AMPULE: .5; 3 SOLUTION RESPIRATORY (INHALATION) at 11:39

## 2022-09-18 RX ADMIN — MEMANTINE HYDROCHLORIDE 10 MG: 10 TABLET ORAL at 09:08

## 2022-09-18 RX ADMIN — IPRATROPIUM BROMIDE AND ALBUTEROL SULFATE 1 AMPULE: .5; 3 SOLUTION RESPIRATORY (INHALATION) at 07:56

## 2022-09-18 RX ADMIN — AMLODIPINE BESYLATE 5 MG: 5 TABLET ORAL at 09:08

## 2022-09-18 ASSESSMENT — PAIN SCALES - GENERAL: PAINLEVEL_OUTOF10: 0

## 2022-09-18 NOTE — PROGRESS NOTES
Pt A&O in the chair. Pt tolerating PO intake well. AM medications administered without complications. Pt has no complaints at this time. Pt ambulating with SBA x1. Continues with 1L NC. 02 92%. Call light within reach. Able to make needs known. Fall precautions in place. Will monitor.  Electronically signed by Nelia Fitzpatrick RN on 9/18/2022 at 2:45 PM

## 2022-09-18 NOTE — PLAN OF CARE
Problem: Discharge Planning  Goal: Discharge to home or other facility with appropriate resources  9/18/2022 0050 by Vane Leija RN  Outcome: Progressing  Flowsheets (Taken 9/17/2022 2134)  Discharge to home or other facility with appropriate resources:   Identify barriers to discharge with patient and caregiver   Arrange for needed discharge resources and transportation as appropriate   Identify discharge learning needs (meds, wound care, etc)  9/17/2022 1746 by Nichole Yi RN  Outcome: Progressing  Flowsheets (Taken 9/17/2022 1746)  Discharge to home or other facility with appropriate resources: Identify barriers to discharge with patient and caregiver     Problem: Skin/Tissue Integrity  Goal: Absence of new skin breakdown  Description: 1. Monitor for areas of redness and/or skin breakdown  2. Assess vascular access sites hourly  3. Every 4-6 hours minimum:  Change oxygen saturation probe site  4. Every 4-6 hours:  If on nasal continuous positive airway pressure, respiratory therapy assess nares and determine need for appliance change or resting period. 9/18/2022 0050 by Vane Leija RN  Outcome: Progressing  9/17/2022 1746 by Nichole Yi RN  Outcome: Progressing  Note: Pt shows no new signs of skin integrity. Will monitor and encourage patient to turn and reposition every two hours.       Problem: ABCDS Injury Assessment  Goal: Absence of physical injury  9/18/2022 0050 by Vane Leija RN  Outcome: Progressing  9/17/2022 1746 by Nichole Yi RN  Outcome: Progressing  Flowsheets (Taken 9/17/2022 1746)  Absence of Physical Injury: Implement safety measures based on patient assessment     Problem: Respiratory - Adult  Goal: Achieves optimal ventilation and oxygenation  9/18/2022 0050 by Vane Leija RN  Outcome: Progressing  Flowsheets (Taken 9/17/2022 2134)  Achieves optimal ventilation and oxygenation: Assess for changes in respiratory status  9/17/2022 1746 by Nichole Yi RN  Outcome: Progressing  Flowsheets (Taken 9/17/2022 1746)  Achieves optimal ventilation and oxygenation: Assess for changes in respiratory status     Problem: Cardiovascular - Adult  Goal: Maintains optimal cardiac output and hemodynamic stability  9/18/2022 0050 by Vane Leija RN  Outcome: Progressing  9/17/2022 1746 by Nichole Yi RN  Outcome: Progressing  Flowsheets (Taken 9/17/2022 1746)  Maintains optimal cardiac output and hemodynamic stability: Monitor blood pressure and heart rate  Goal: Absence of cardiac dysrhythmias or at baseline  9/18/2022 0050 by Vane Leija RN  Outcome: Progressing  Flowsheets (Taken 9/17/2022 2134)  Absence of cardiac dysrhythmias or at baseline: Monitor cardiac rate and rhythm  9/17/2022 1746 by Nichole Yi RN  Outcome: Progressing  Flowsheets (Taken 9/17/2022 1746)  Absence of cardiac dysrhythmias or at baseline: Monitor cardiac rate and rhythm     Problem: Metabolic/Fluid and Electrolytes - Adult  Goal: Electrolytes maintained within normal limits  9/18/2022 0050 by Vane Leija RN  Outcome: Progressing  Flowsheets (Taken 9/17/2022 2134)  Electrolytes maintained within normal limits: Monitor labs and assess patient for signs and symptoms of electrolyte imbalances  9/17/2022 1746 by Nichole Yi RN  Outcome: Progressing  Flowsheets (Taken 9/17/2022 1746)  Electrolytes maintained within normal limits: Monitor labs and assess patient for signs and symptoms of electrolyte imbalances  Goal: Hemodynamic stability and optimal renal function maintained  9/18/2022 0050 by Vane Leija RN  Outcome: Progressing  Flowsheets (Taken 9/17/2022 2134)  Hemodynamic stability and optimal renal function maintained: Monitor labs and assess for signs and symptoms of volume excess or deficit  9/17/2022 1746 by Nichole Yi RN  Outcome: Progressing  Flowsheets (Taken 9/17/2022 1746)  Hemodynamic stability and optimal renal function maintained: Monitor labs and assess for signs and symptoms of volume excess or deficit     Problem: Safety - Adult  Goal: Free from fall injury  9/18/2022 0050 by Joseph Fitzpatrick RN  Outcome: Progressing  9/17/2022 1746 by Hugo Carranza RN  Outcome: Progressing  Flowsheets (Taken 9/17/2022 1746)  Free From Fall Injury: Instruct family/caregiver on patient safety     Problem: Pain  Goal: Verbalizes/displays adequate comfort level or baseline comfort level  9/18/2022 0050 by Joseph Fitzpatrick RN  Outcome: Progressing  Flowsheets (Taken 9/17/2022 1942)  Verbalizes/displays adequate comfort level or baseline comfort level:   Encourage patient to monitor pain and request assistance   Assess pain using appropriate pain scale   Administer analgesics based on type and severity of pain and evaluate response   Implement non-pharmacological measures as appropriate and evaluate response   Consider cultural and social influences on pain and pain management   Notify Licensed Independent Practitioner if interventions unsuccessful or patient reports new pain  9/17/2022 1746 by Hugo Carranza RN  Outcome: Progressing  Flowsheets (Taken 9/17/2022 1746)  Verbalizes/displays adequate comfort level or baseline comfort level:   Encourage patient to monitor pain and request assistance   Administer analgesics based on type and severity of pain and evaluate response   Assess pain using appropriate pain scale   Implement non-pharmacological measures as appropriate and evaluate response

## 2022-09-18 NOTE — DISCHARGE INSTR - COC
Continuity of Care Form    Patient Name: Ct García   :  1932  MRN:  [id number]    Admit date:  2022  Discharge date:  22    Code Status Order: Full Code   Advance Directives:     Admitting Physician:  Zabrina Kwong DO  PCP: Estefana Rubinstein, MD    Discharging Nurse: [facility] Unit/Room#: L5L-4184/3127-01  Discharging Unit Phone Number: [phone]    Emergency Contact:   Extended Emergency Contact Information  Primary Emergency Contact: [facility] Phone: [phone]  Relation: Child  Secondary Emergency Contact: Ginny Gaytan. Phone: [phone]  Relation: Child    Past Surgical History:  Past Surgical History:   Procedure Laterality Date    CORONARY ANGIOPLASTY WITH [address]       Immunization History:   Immunization History   Administered Date(s) Administered    COVID-19, [address] border, Primary or Immunocompromised, (age 12y+), IM, 100 mcg/0.5mL 2021, 2021    COVID-19, PFIZER GRAY top, DO NOT Dilute, (age 15 y+), IM, 30 mcg/0.3 mL 2022    Influenza, High Dose (Fluzone 65 yrs and older) 2017, 2018, 2020    Pneumococcal Conjugate 13-valent (Lella Hutching) 10/30/2017    Pneumococcal Polysaccharide (Cesyeiwwf53) 2020    Zoster Recombinant (Shingrix) 2020       Active Problems:  Patient Active Problem List   Diagnosis Code    COPD, mild ([facility] [address] G43.9    Diastolic dysfunction with chronic heart failure (HCC) I50.32    Other allergic rhinitis J30.89    Acute respiratory failure with hypoxia and hypercapnia (HCC) J96.01, J96.02    Coronary artery disease involving native coronary artery of native heart without angina pectoris I25.10    Primary hypertension I10    Lung mass R91.8    Renal mass, right N28.89       Isolation/Infection:   Isolation            No Isolation          Patient Infection Status       Infection Onset Added Last Indicated Last Indicated By Review Planned Expiration Resolved Resolved By    None active    Resolved    COVID-19 (Rule Out) 09/16/22 09/16/22 09/16/22 COVID-19, Rapid (Ordered)   09/16/22 Rule-Out Test Resulted            Nurse Assessment:  Last Vital Signs: BP (!) 140/64   Pulse 52   Temp 98.5 °F (36.9 °C) (Oral)   Resp 16   Ht 5' 2\" (1.575 m)   Wt 136 lb 11 oz (62 kg)   SpO2 91%   BMI 25.00 kg/m²     Last documented pain score (0-10 scale): Pain Level: 0  Last Weight:   Wt Readings from Last 1 Encounters:   09/18/22 136 lb 11 oz (62 kg)     Mental Status:  oriented and alert    IV Access:  - None    Nursing Mobility/ADLs:  Walking   Assisted  Transfer  Assisted  Bathing  Assisted  Dressing  Assisted  Toileting  Independent  Feeding  Independent  Med Admin  Assisted  Med Delivery   whole    Wound Care Documentation and Therapy:        Elimination:  Continence: Bowel: Yes  Bladder: No  Urinary Catheter: None   Colostomy/Ileostomy/Ileal Conduit: No       Date of Last BM: 9/17/22    Intake/Output Summary (Last 24 hours) at 9/18/2022 1114  Last data filed at 9/18/2022 1030  Gross per 24 hour   Intake 480 ml   Output 600 ml   Net -120 ml     I/O last 3 completed shifts: In: 600 [P.O.:600]  Out: 1250 [Urine:1250]    Safety Concerns: At Risk for Falls    Impairments/Disabilities:      None    Nutrition Therapy:  Current Nutrition Therapy:   - Oral Diet:  General    Routes of Feeding: Oral  Liquids: Thin Liquids  Daily Fluid Restriction: yes - amount 1500 mL  Last Modified Barium Swallow with Video (Video Swallowing Test): not done    Treatments at the Time of Hospital Discharge:   Respiratory Treatments:   Oxygen Therapy:  is on oxygen at 1 L/min per nasal cannula. Ventilator:    - No ventilator support    Rehab Therapies:   Weight Bearing Status/Restrictions: No weight bearing restrictions  Other Medical Equipment (for information only, NOT a DME order):     Other Treatments:     Patient's personal belongings (please select all that are sent with

## 2022-09-18 NOTE — CARE COORDINATION
DISCHARGE SUMMARY     DATE OF DISCHARGE: 9/18/22    DISCHARGE DESTINATION: home    HOME CARE: Yes    Agency Name: [facility]  Discharging to Facility/ Agency   Name:  [facility] care    Address: [address] [place] [address]., [facility]., [place] [address]  Phone: [phone]  Fax: [phone]     Notified: RN, Family, and Facility/Agency    TRANSPORTATION: Private Car w/ daughter    NEW DME ORDERED: yes    COMMENTS: home O2 @ 1l  per Maricruz    Electronically signed by Lauren Warren on 9/18/2022 at 12:48 PM  #[phone]

## 2022-09-18 NOTE — PROGRESS NOTES
[facility]    Respiratory Therapy   Home Oxygen Evaluation        Name: Jennifer Raya Record Number: [id number]  Age: 80 y.o. Gender:  female   : 1932  Today's date: 2022  Room: Z0F-6045/3127-01      Assessment        BP (!) 140/64   Pulse 52   Temp 98.5 °F (36.9 °C) (Oral)   Resp 16   Ht 5' 2\" (1.575 m)   Wt 136 lb 11 oz (62 kg)   SpO2 92%   BMI 25.00 kg/m²     Patient Active Problem List   Diagnosis    COPD, mild (HCC)    Diastolic dysfunction with chronic heart failure (HCC)    Other allergic rhinitis    Acute respiratory failure with hypoxia and hypercapnia (HCC)    Coronary artery disease involving native coronary artery of native heart without angina pectoris    Primary hypertension    Lung mass    Renal mass, right       Social History:  Social History     Tobacco Use    Smoking status: Former     Types: Cigarettes    Smokeless tobacco: Never    Tobacco comments:     quit 45 years ago - socially   Vaping Use    Vaping Use: Never used   Substance Use Topics    Alcohol use: Yes     Comment: socially    Drug use: No       Patient Room Air saturation at rest 86  %      Oxygen saturations of 88% or less on RA qualifies patient for Home Oxygen    Patient resting on 0  lmp  with an oxygen saturation of  86 %     Patient ambulated on 0 lpm with an oxygen saturation of 86%    Patient ambulated on 1 lpm with an oxygen saturation of 92%    Qualifying patient for home oxygen with ambulation and continuous flow  @ 1 lpm.      In your clinical assessment does the Patient Require Portable Oxygen Tanks?     Yes              [facility]  home care company contacted to follow care of patients home oxygen needs on 2022 at 11:46 AM    Patient/caregiver was educated on Home Oxygen process:  Yes      Level of patient/caregiver understanding able to:   [x] Verbalize understanding   [] Demonstrate understanding       [] Teach back        [] Needs reinforcement        [] No available caregiver               []  Other:     Response to education:  Good     Time Spent with Home O2 Set Up:  15  minutes     Lucila Mejia RCP on 9/18/2022 at 11:46 AM

## 2022-09-18 NOTE — H&P
Sotolol held due to bradycardia with a HR in the 30's. Await Cardiology evaluation.     Brian Juárez MD

## 2022-09-18 NOTE — PLAN OF CARE
Problem: Discharge Planning  Goal: Discharge to home or other facility with appropriate resources  9/18/2022 1445 by Ramana Franco RN  Outcome: Progressing  Flowsheets (Taken 9/18/2022 1445)  Discharge to home or other facility with appropriate resources: Identify barriers to discharge with patient and caregiver  9/18/2022 0050 by Fabian Davis RN  Outcome: Progressing  Flowsheets (Taken 9/17/2022 2134)  Discharge to home or other facility with appropriate resources:   Identify barriers to discharge with patient and caregiver   Arrange for needed discharge resources and transportation as appropriate   Identify discharge learning needs (meds, wound care, etc)     Problem: Skin/Tissue Integrity  Goal: Absence of new skin breakdown  Description: 1. Monitor for areas of redness and/or skin breakdown  2. Assess vascular access sites hourly  3. Every 4-6 hours minimum:  Change oxygen saturation probe site  4. Every 4-6 hours:  If on nasal continuous positive airway pressure, respiratory therapy assess nares and determine need for appliance change or resting period. 9/18/2022 1445 by Ramana Franco RN  Outcome: Progressing  Note: Pt shows no signs of skin integrity. Will monitor and encourage patient to turn and reposition every two hours.    9/18/2022 0050 by Fabian Davis RN  Outcome: Progressing     Problem: ABCDS Injury Assessment  Goal: Absence of physical injury  9/18/2022 1445 by Ramana Franco RN  Outcome: Progressing  Flowsheets  Taken 9/18/2022 1445 by Ramana Franco RN  Absence of Physical Injury: Implement safety measures based on patient assessment  Taken 9/18/2022 0310 by Fabian Davis RN  Absence of Physical Injury: Implement safety measures based on patient assessment  9/18/2022 0050 by Fabian Davis, RN  Outcome: Progressing     Problem: Respiratory - Adult  Goal: Achieves optimal ventilation and oxygenation  9/18/2022 1445 by Ramana Franco RN  Outcome: Progressing  Flowsheets (Taken 9/18/2022 1445)  Achieves optimal ventilation and oxygenation: Assess for changes in respiratory status  9/18/2022 0050 by Lucinda Sullivan RN  Outcome: Progressing  Flowsheets (Taken 9/17/2022 2134)  Achieves optimal ventilation and oxygenation: Assess for changes in respiratory status     Problem: Cardiovascular - Adult  Goal: Maintains optimal cardiac output and hemodynamic stability  9/18/2022 1445 by Iván Mckinley RN  Outcome: Progressing  Flowsheets (Taken 9/18/2022 1445)  Maintains optimal cardiac output and hemodynamic stability: Monitor blood pressure and heart rate  9/18/2022 0050 by Lucinda Sullivan RN  Outcome: Progressing  Goal: Absence of cardiac dysrhythmias or at baseline  9/18/2022 1445 by Iván Mckinley RN  Outcome: Progressing  Flowsheets (Taken 9/18/2022 1445)  Absence of cardiac dysrhythmias or at baseline: Monitor cardiac rate and rhythm  9/18/2022 0050 by Lucinda Sullivan RN  Outcome: Progressing  Flowsheets (Taken 9/17/2022 2134)  Absence of cardiac dysrhythmias or at baseline: Monitor cardiac rate and rhythm     Problem: Metabolic/Fluid and Electrolytes - Adult  Goal: Electrolytes maintained within normal limits  9/18/2022 1445 by Iván Mckinley RN  Outcome: Progressing  Flowsheets (Taken 9/18/2022 1445)  Electrolytes maintained within normal limits: Monitor labs and assess patient for signs and symptoms of electrolyte imbalances  9/18/2022 0050 by Lucinda Sullivan RN  Outcome: Progressing  Flowsheets (Taken 9/17/2022 2134)  Electrolytes maintained within normal limits: Monitor labs and assess patient for signs and symptoms of electrolyte imbalances  Goal: Hemodynamic stability and optimal renal function maintained  9/18/2022 1445 by Iván Mckinley RN  Outcome: Progressing  Flowsheets (Taken 9/18/2022 1445)  Hemodynamic stability and optimal renal function maintained: Monitor labs and assess for signs and symptoms of volume excess or deficit  9/18/2022 0050 by Lucinda Sullivan RN  Outcome: Progressing  Flowsheets (Taken 9/17/2022 2134)  Hemodynamic stability and optimal renal function maintained: Monitor labs and assess for signs and symptoms of volume excess or deficit     Problem: Safety - Adult  Goal: Free from fall injury  9/18/2022 1445 by Poppy Reyes RN  Outcome: Progressing  Flowsheets  Taken 9/18/2022 1445 by Poppy Reyes RN  Free From Fall Injury: Instruct family/caregiver on patient safety  Taken 9/18/2022 0310 by Chalo Lakhani RN  Free From Fall Injury: Instruct family/caregiver on patient safety  9/18/2022 0050 by Chalo Lakhani RN  Outcome: Progressing     Problem: Pain  Goal: Verbalizes/displays adequate comfort level or baseline comfort level  9/18/2022 1445 by Poppy Reyes RN  Outcome: Progressing  Flowsheets (Taken 9/18/2022 1445)  Verbalizes/displays adequate comfort level or baseline comfort level: Encourage patient to monitor pain and request assistance  9/18/2022 0050 by Chalo Lakhani RN  Outcome: Progressing  Flowsheets (Taken 9/17/2022 1942)  Verbalizes/displays adequate comfort level or baseline comfort level:   Encourage patient to monitor pain and request assistance   Assess pain using appropriate pain scale   Administer analgesics based on type and severity of pain and evaluate response   Implement non-pharmacological measures as appropriate and evaluate response   Consider cultural and social influences on pain and pain management   Notify Licensed Independent Practitioner if interventions unsuccessful or patient reports new pain

## 2022-09-18 NOTE — PROGRESS NOTES
Pt discharged to home. Transported out with wheelchair by RN. Accompanied by daughter. Transported in personal vehicle. Discharge instructions, portable 02 tank and personal belongings given to pt. Rx sent electronically to pharmacy. Explanation of discharge medications and instructions understood by verbal statement. No questions, comments or concerns at this time.   Electronically signed by Kory Snow RN on 9/18/2022 at 2:48 PM

## 2022-09-18 NOTE — CONSULTS
any  gallop, murmur, or rub. ABDOMEN:  Soft and nontender. Bowel sounds are present. EXTREMITIES:  No edema. NEUROLOGIC:  Alert, oriented. Cranial nerves II through XII intact. No  focal deficits. SKIN:  No rashes. LABORATORY DATA:  Sodium is 146, potassium 4.7, chloride 105, bicarb 35,  BUN is 30, creatinine 1.1. ProBNP was 6402. Liver functions are  normal.  TSH is 1.13. Glucose is normal.  The white count 8000,  hemoglobin 13.3, hematocrit 39.9. Platelets are normal.  Blood gas  showed pH of 7.33, pCO2 60, pO2 is 87.9. Chest x-ray shows no acute process. EKG shows sinus rhythm, left  bundle-branch block. IMPRESSION:  1. This is an 80-year-old female who has presented with sudden onset of  shortness of breath and appeared to have acute-on-chronic hypoxic  respiratory failure, etiology needs to be determined. She has elevated  BNP, but surprisingly her chest x-ray is clear. Congestive heart  failure is a strong possibility, but we need to rule out pulmonary  embolism, which can certainly cause hypoxic respiratory failure and have  elevated BNP. 2.  Coronary artery disease with remote stenting. The patient is  currently stable. 3.  Hypertension. Blood pressure is mildly elevated. Goal is less than  130/80. 4.  Remote history of paroxysmal atrial flutter, not on anticoagulation  therapy for unclear reason. 5.  History of COPD.     RECOMMENDATIONS:  Echo reviewed, LV systolic function is normal   HFPEF   Continuedcurrent medical therapy   D/c on PO lasix 20 mg     No further inpatient testing  Cardiology will sign off   Re-consult PRN           Eugenio Lang MD [address], Interventional Cardiology, and Peripheral Vascular [address] [facility]   (O): [phone]  (F): [phone]

## 2022-09-19 ENCOUNTER — TELEPHONE (OUTPATIENT)
Dept: PULMONOLOGY | Age: 87
End: 2022-09-19

## 2022-09-19 ENCOUNTER — CARE COORDINATION (OUTPATIENT)
Dept: CASE MANAGEMENT | Age: 87
End: 2022-09-19

## 2022-09-19 NOTE — CARE COORDINATION
[facility] Transitions Initial Follow Up Call    Call within 2 business days of discharge: No    Patient: Jacqui Cantrell Patient : 1932   MRN: [id number]  Reason for Admission: ARF with hypoxia  Discharge Date: 22 RARS: Readmission Risk Score: 12.3      Last Discharge [facility]       Date Complaint Diagnosis Description Type Department Provider    22 Shortness of Breath Acute respiratory failure with hypoxia and hypercapnia ([address] . .. ED to Hosp-Admission (Discharged) (ADMITTED) HEMA 3W Samina Seth DO; Jose Falk. .. Spoke with: no one    Facility: [address] [place] [address] Transitions 24 Hour Call    Care Transitions Interventions         Follow Up  Future Appointments   Date Time Provider Carlotta Felipe   10/6/2022  9:20 AM Yoandy Jose MD [facility] PUL[facility]       First initial 24 hr follow up outreach call attempt, no answer. Received a message saying \"patient is not accepting your call right now. \"  CTN will continue with outreach call attempts.     YURI Lang, RN   Care Transition Nurse  Mobile: [phone]

## 2022-09-19 NOTE — TELEPHONE ENCOUNTER
Daughter Morteza Mitchell calls. Roseline Gupta was D/C from [facility] on 9/18. Went home on home oxygen. Requesting if they can be seen sooner than 10/6/22. ( Current appointment) to discuss her condition, oxygen usage and respiratory therapy. Please advise if sooner appointment is needed?

## 2022-09-19 NOTE — CARE COORDINATION
[facility]    DC order noted, all docs needed have been faxed to [facility] for home care services. Home care to see patient 9/21/22.     Nafisa Brooks RN, BSN CTN  [facility] [phone]

## 2022-09-20 ENCOUNTER — CARE COORDINATION (OUTPATIENT)
Dept: CASE MANAGEMENT | Age: 87
End: 2022-09-20

## 2022-09-20 NOTE — CARE COORDINATION
[facility] [facility] Transitions Initial Follow Up Call    Call within 2 business days of discharge: Yes    Patient: Karissa Hill Patient : 1932   MRN: [id number]  Reason for Admission: ARF  Discharge Date: 22 RARS: Readmission Risk Score: 12.3      Last Discharge [facility]       Date Complaint Diagnosis Description Type Department Provider    22 Shortness of Breath Acute respiratory failure with hypoxia and hypercapnia ([address] . .. ED to Hosp-Admission (Discharged) (ADMITTED) HEMA 3W Samina Seth DO; Armand Habermann. .. Spoke with: no one    Facility: [address] [place] [address] Transitions 24 Hour Call    Care Transitions Interventions         Follow Up  Future Appointments   Date Time Provider Carlotta Felipe   2022 12:00 PM Luis Bob MD [facility] PULM [facility]   10/6/2022  9:20 AM [facility]       Second and final outreach call attempt, no answer. Could not leave a VM as the number stated they were not accepting calls. CTN will resolve episode and remain available. Call placed to [facility] to check on referral and SOC. SOC is .      YURI Segal, RN   Care Transition Nurse  Mobile: [phone]

## 2022-09-20 NOTE — TELEPHONE ENCOUNTER
Spoke to daughter Yvonne Garces and scheduled an appointment this Friday, 9/23 at 12:00 pm with Dr. Esther Acharya.

## 2022-09-23 ENCOUNTER — OFFICE VISIT (OUTPATIENT)
Dept: PULMONOLOGY | Age: 87
End: 2022-09-23
Payer: MEDICARE

## 2022-09-23 VITALS
RESPIRATION RATE: 19 BRPM | HEART RATE: 53 BPM | OXYGEN SATURATION: 96 % | BODY MASS INDEX: 25.83 KG/M2 | TEMPERATURE: 97.6 F | HEIGHT: 62 IN | SYSTOLIC BLOOD PRESSURE: 120 MMHG | WEIGHT: 140.4 LBS | DIASTOLIC BLOOD PRESSURE: 75 MMHG

## 2022-09-23 DIAGNOSIS — J96.11 CHRONIC RESPIRATORY FAILURE WITH HYPOXIA (HCC): ICD-10-CM

## 2022-09-23 DIAGNOSIS — J44.9 COPD, MODERATE (HCC): ICD-10-CM

## 2022-09-23 PROCEDURE — 99214 OFFICE O/P EST MOD 30 MIN: CPT | Performed by: INTERNAL MEDICINE

## 2022-09-23 PROCEDURE — 1123F ACP DISCUSS/DSCN MKR DOCD: CPT | Performed by: INTERNAL MEDICINE

## 2022-09-23 RX ORDER — SPIRONOLACTONE 25 MG/1
25 TABLET ORAL DAILY
COMMUNITY

## 2022-09-23 NOTE — PROGRESS NOTES
REASON FOR CONSULTATION/CC: copd vs asthma      Consult at request of  Yolette Roche MD for shortness of breath     PCP: Yolette Roche MD    HISTORY OF PRESENT ILLNESS: Aristeo Pace is a 80y.o. year old female with a history of asthma/copd who presents :     History  Patient diagnosed with COPD with a pulmonary function test 2017 demonstrating FEV1 64%, bronchodilator response, DLCO 63%. Current history       COPD  Continues on Trelegy. Acute / chronic hypoxemia  She was discharged on o2. Current at 1 L NC with desaturation to 84% on room air   She has large E tank. CHF  Seeing cardiology  Adjusting medications          Objective:   PHYSICAL EXAM:  Blood pressure 120/75, pulse 53, temperature 97.6 °F (36.4 °C), resp. rate 19, height 5' 2\" (1.575 m), weight 140 lb 6.4 oz (63.7 kg), SpO2 96 %, not currently breastfeeding.'    Gen: No distress. Eyes:    ENT:    Neck:    Resp: No accessory muscle use. No crackles. No wheezes. No rhonchi. CV: Regular rate. Regular rhythm. No murmur or rub. No edema. GI:    Skin:    Lymph:    M/S: No cyanosis. No clubbing. Neuro: Moves all four extremities. Psych: Oriented x 3. No anxiety. Awake. Alert. Intact judgement and insight. Data Reviewed:        Assessment:       Chronic bronchitis versus asthmatic bronchitis  Chronic rhinitis    Plan:            Problem List Items Addressed This Visit       Chronic respiratory failure with hypoxia ([address]      Aristeo Pace continues to need and benefit from supplemental oxygen. she is using oxygen continuously at 1 L NC. Will send order for smaller tank. We discussed pro / con of POC.  They would like to hold off on POC at this poing           COPD, moderate ([address]      Continues Trelegy

## 2022-09-23 NOTE — ASSESSMENT & PLAN NOTE
Senait Gamble continues to need and benefit from supplemental oxygen. she is using oxygen continuously at 1 L NC. Will send order for smaller tank. We discussed pro / con of POC.  They would like to hold off on POC at this poing

## 2022-09-27 NOTE — DISCHARGE SUMMARY
Hospital Medicine Discharge Summary    Patient: Carisa Artis     Gender: female  : 1932   Age: 80 y.o. MRN: [id number]    Code Status: Prior full code    Primary Care Provider: Rocio Argueta MD    Admit Date: 2022   Discharge Date: 2022      Admitting Physician: Reno Kim DO  Discharge Physician: Reno Kim DO     Discharge Diagnoses: Active Hospital Problems    Diagnosis Date Noted    Lung mass [R91.8] 2022     Priority: Medium    Renal mass, right [N28.89] 2022     Priority: Medium    Chronic respiratory failure with hypoxia [facility]) [J96.11] 2022     Priority: Medium    Coronary artery disease involving native coronary artery of native heart without angina pectoris [I25.10] 2022     Priority: Medium    Primary hypertension [I10] 2022     Priority: Medium       Hospital Course:    a 80 y.o. female atrial fib, mild copd and mild hfpef who presented to [facility] with sob. Per ems she was 60 % on room air but in the ed she was in the high 90s on 2L. She was given lasix 20 mg iv. ED workup  Vitals: 100% on 3L -> down to 1L. Pertinent labs: bnp 6400, abg 7.33/60.6/87.9  Imaging: cxr: no overwhelming evidence of volume overload or infectious process  Work up completed, and improved with treatment as below. was discharged today in stable condition.      Acute hypoxic and hypercarbic respiratory failure, POA - improving, home on 1-2 L nc  - with criteria: < 90% on RA, RR> 18  - supplemental oxygen as necessary to keep SaO2 > 90%; not on o2 at baseline  - abg with pH 7.33, pco2 60.6, po2 88  - ct chest with new lung nodules and a rt renal mass  - follow up with pulm      Renal mass  Lung nodules  - consulted oncology  - pt doesn't want to work up or treat at this point     Acute on chronic diastolic CHF without exacerbation  - cxr without signs of volume overload  - last ECHO: 2020 lvef 50-55%, borderline global hypokinesis of the  09/17/2022 06:28 AM     09/18/2022 06:29 AM    K 3.5 09/18/2022 06:29 AM    K 4.7 09/16/2022 09:16 AM     09/18/2022 06:29 AM    CO2 33 09/18/2022 06:29 AM    BUN 37 09/18/2022 06:29 AM    CREATININE 1.2 09/18/2022 06:29 AM    CALCIUM 9.5 09/18/2022 06:29 AM    ALKPHOS 58 09/16/2022 09:16 AM    ALT 17 09/16/2022 09:16 AM    AST 27 09/16/2022 09:16 AM    BILITOT 0.8 09/16/2022 09:16 AM    LABALBU 4.5 09/16/2022 09:16 AM    LDLCALC 31 09/17/2022 06:28 AM    TRIG 68 09/17/2022 06:28 AM     No results found for: INR    Radiology:  CT CHEST W CONTRAST   Final Result   1. No evidence of acute pulmonary embolism or acute aortic disease. 2. Increase in number of multiple nodules in the upper and lower lobes. This   is suspicious for possible metastatic disease. 3. Faint ground-glass opacities in the upper lobes and to lesser degree lower   lobes are nonspecific. No active pleural disease. 4. Mild atelectatic changes both lower lobes. No active pleural disease. 5. A right renal mass measuring 1.5 cm has the appearance of likely renal   cancer. RECOMMENDATIONS:   1. Workup for possible metastatic disease. 2. Workup of the right renal lesion for possible renal cancer. XR CHEST PORTABLE   Final Result   No acute process.              Discharge Medications:   Discharge Medication List as of 9/18/2022  1:49 PM        START taking these medications    Details   ipratropium-albuterol (DUONEB) 0.5-2.5 (3) MG/3ML SOLN nebulizer solution Inhale 3 mLs into the lungs every 4 hours (while awake), Disp-360 mL, R-3Normal           Discharge Medication List as of 9/18/2022  1:49 PM        CONTINUE these medications which have CHANGED    Details   furosemide (LASIX) 20 MG tablet Take 2 tablets by mouth daily, Disp-60 tablet, R-0Normal           Discharge Medication List as of 9/18/2022  1:49 PM        CONTINUE these medications which have NOT CHANGED    Details   QUEtiapine (SEROQUEL) 25 MG tablet Take 25 mg by mouth at bedtimeHistorical Med      loratadine (CLARITIN) 10 MG tablet Take 10 mg by mouth daily as neededHistorical Med      !! Multiple Vitamins-Minerals (THERAPEUTIC MULTIVITAMIN-MINERALS) tablet Take 1 tablet by mouth dailyHistorical Med      rosuvastatin (CRESTOR) 10 MG tablet Take 10 mg by mouth at bedtimeHistorical Med      albuterol sulfate  (90 Base) MCG/ACT inhaler INHALE 2 PUFFS INTO THE LUNGS EVERY 6 HOURS AS NEEDED FOR WHEEZING OR SHORTNESS OF BREATH, Disp-8.5 g, R-11Normal      TRELEGY ELLIPTA 100-62.5-25 MCG/INH AEPB INHALE 1 PUFF INTO THE LUNGS DAILY, Disp-60 each, R-11Normal      aspirin 81 MG chewable tablet Take 81 mg by mouth dailyHistorical Med      Cyanocobalamin (VITAMIN B12 PO) Take by mouth dailyHistorical Med      donepezil (ARICEPT) 10 MG tablet Take 10 mg by mouth nightlyHistorical Med      memantine (NAMENDA) 10 MG tablet Take 10 mg by mouth 2 times dailyHistorical Med      nitroGLYCERIN (NITROSTAT) 0.4 MG SL tablet Place 0.4 mg under the tongue every 5 minutes as needed for Chest pain up to max of 3 total doses. If no relief after 1 dose, call 911. Historical Med      sertraline (ZOLOFT) 25 MG tablet Take 75 mg by mouth dailyHistorical Med      irbesartan (AVAPRO) 300 MG tablet Take 300 mg by mouth nightlyHistorical Med      levothyroxine (SYNTHROID) 150 MCG tablet Take 150 mcg by mouth DailyHistorical Med      isosorbide mononitrate (IMDUR) 30 MG extended release tablet Take 30 mg by mouth dailyHistorical Med      alendronate (FOSAMAX) 70 MG tablet Take 70 mg by mouth once a weekHistorical Med      ezetimibe (ZETIA) 10 MG tablet Take 10 mg by mouth dailyHistorical Med      Ascorbic Acid (VITAMIN C) 1000 MG tablet Take 1,000 mg by mouth dailyHistorical Med      Coenzyme Q10 (CO Q 10 PO) Take by mouth dailyHistorical Med      !! Multiple Vitamins-Minerals (OCUVITE PO) Take by mouth dailyHistorical Med      calcium carbonate (OSCAL) 500 MG TABS tablet Take 500 mg by mouth 2 times dailyHistorical Med      amLODIPine (NORVASC) 5 MG tablet Take 5 mg by mouth dailyHistorical Med       !! - Potential duplicate medications found. Please discuss with provider. Discharge Medication List as of 9/18/2022  1:49 PM        STOP taking these medications       sotalol (BETAPACE) 80 MG tablet Comments:   Reason for Stopping:         albuterol (PROVENTIL) (2.5 MG/3ML) 0.083% nebulizer solution Comments:   Reason for Stopping:         ALPRAZolam (XANAX) 0.25 MG tablet Comments:   Reason for Stopping:         montelukast (SINGULAIR) 10 MG tablet Comments:   Reason for Stopping: Follow-up appointments:  one week    Provider Follow-up:    pcp    Condition at Discharge:  Stable    The patient was seen and examined on day of discharge and this discharge summary is in conjunction with any daily progress note from day of discharge. Time Spent on discharge is 45 minutes  in the examination, evaluation, counseling and review of medications and discharge plan. Signed:    Keshawn Form, DO   9/27/2022      Thank you Marco A James MD for the opportunity to be involved in this patient's care. If you have any questions or concerns please feel free to contact me at [phone].

## 2023-05-26 ENCOUNTER — TELEPHONE (OUTPATIENT)
Dept: PULMONOLOGY | Age: 88
End: 2023-05-26

## 2023-05-26 DIAGNOSIS — J44.9 CHRONIC BRONCHITIS WITH COPD (CHRONIC OBSTRUCTIVE PULMONARY DISEASE) (HCC): ICD-10-CM

## 2023-05-29 RX ORDER — ALBUTEROL SULFATE 90 UG/1
2 AEROSOL, METERED RESPIRATORY (INHALATION) EVERY 6 HOURS PRN
Qty: 8.5 G | Refills: 5 | Status: SHIPPED | OUTPATIENT
Start: 2023-05-29

## 2023-07-27 DIAGNOSIS — J44.9 COPD, MILD (HCC): ICD-10-CM

## 2023-07-27 NOTE — TELEPHONE ENCOUNTER
From: Susanna Henderson  To:  Office of Dr. Martini Blocker: 7/27/2023 11:18 AM EDT  Subject: Medication Renewal Request    Refills have been requested for the following medications:     Claven [place] 100-62.5-25 MCG/INH AEPB Forrest Mckay, APRN - CNP]   Patient Comment: Need new prescription ASAP    Preferred pharmacy: [address]

## 2023-07-31 ENCOUNTER — TELEPHONE (OUTPATIENT)
Dept: PULMONOLOGY | Age: 88
End: 2023-07-31

## 2024-05-15 ENCOUNTER — TELEMEDICINE (OUTPATIENT)
Dept: PULMONOLOGY | Age: 89
End: 2024-05-15
Payer: MEDICARE

## 2024-05-15 ENCOUNTER — PATIENT MESSAGE (OUTPATIENT)
Dept: PULMONOLOGY | Age: 89
End: 2024-05-15

## 2024-05-15 DIAGNOSIS — J44.9 COPD, MODERATE (HCC): ICD-10-CM

## 2024-05-15 DIAGNOSIS — I48.91 ATRIAL FIBRILLATION WITH RVR (HCC): ICD-10-CM

## 2024-05-15 DIAGNOSIS — I82.4Z3 ACUTE DEEP VEIN THROMBOSIS (DVT) OF DISTAL VEIN OF BOTH LOWER EXTREMITIES (HCC): ICD-10-CM

## 2024-05-15 DIAGNOSIS — J96.11 CHRONIC RESPIRATORY FAILURE WITH HYPOXIA (HCC): Primary | ICD-10-CM

## 2024-05-15 PROCEDURE — 99214 OFFICE O/P EST MOD 30 MIN: CPT | Performed by: INTERNAL MEDICINE

## 2024-05-15 PROCEDURE — 1123F ACP DISCUSS/DSCN MKR DOCD: CPT | Performed by: INTERNAL MEDICINE

## 2024-05-15 NOTE — PROGRESS NOTES
good indicators for anticoagulation.  She has fallen with a hip fracture but is currently mobile with a walker.    Since she has been sent to the hospital, hopefully the IVC filter is addressed and considered for removal after full evaluation has been completed.

## 2024-05-16 NOTE — TELEPHONE ENCOUNTER
From: Dr. Kasi Mckeon  To: Alexa Kimbrough  Sent: 5/15/2024 3:05 PM EDT  Subject: called [facility].    Called [facility] and talked to the charge nurse about the basics.

## 2024-05-17 DIAGNOSIS — J44.9 COPD, MILD (HCC): ICD-10-CM

## 2024-05-17 PROBLEM — I48.91 ATRIAL FIBRILLATION WITH RVR (HCC): Status: ACTIVE | Noted: 2024-05-17

## 2024-05-17 PROBLEM — I82.4Z3 ACUTE DEEP VEIN THROMBOSIS (DVT) OF DISTAL VEIN OF BOTH LOWER EXTREMITIES (HCC): Status: ACTIVE | Noted: 2024-05-17

## 2024-05-17 RX ORDER — FLUTICASONE FUROATE, UMECLIDINIUM BROMIDE AND VILANTEROL TRIFENATATE 100; 62.5; 25 UG/1; UG/1; UG/1
POWDER RESPIRATORY (INHALATION)
Qty: 60 EACH | Refills: 11 | Status: SHIPPED | OUTPATIENT
Start: 2024-05-17

## 2024-05-17 NOTE — ASSESSMENT & PLAN NOTE
Patient has a significant change since last time I saw her.  She was admitted to hospital for hip fracture.  Attempted to get outside records.  Unfortunately, this was not successful via care everywhere.  It family states that she had a DVT and a presumed PE.  Therefore they started on a heparin drip.  IVC filter was placed preoperatively with surgery completed that day.    Since then, she has had worsening hypoxemia atrial fibrillation with RVR.    She was discharged on aspirin only.  Unclear why a she was not treated for DVT or PE and why the IVC filter was placed.  They do not endorse bleeding.    She was on 2 L nasal cannula when video call started, significant hypoxemia requiring 5 L nasal cannula by the end of the video visit barely holding saturations of 90%.  Therefore, she was advised to go to the emergency room for further workup and clarity on this hypoxemic

## 2024-05-17 NOTE — ASSESSMENT & PLAN NOTE
Currently uncontrolled with heart rate 147 despite going up on oxygen.  Again, being sent to the hospital.

## 2024-05-17 NOTE — ASSESSMENT & PLAN NOTE
DVT by verbal report.  It is unclear whether this was unilateral or bilateral.  In addition, it is very unclear why IVC filter was placed prior to surgery if she was able to use a heparin drip.  The plan was to remove the IVC filter 30 days after surgery but this was completed in [place] and she is now returned home in [place].    Currently in atrial fibrillation with RVR with hypoxemia that is worsening.    Therefore, she has 2 good indicators for anticoagulation.  She has fallen with a hip fracture but is currently mobile with a walker.    Since she has been sent to the hospital, hopefully the IVC filter is addressed and considered for removal after full evaluation has been completed.

## 2024-05-17 NOTE — TELEPHONE ENCOUNTER
Last appt: 5/15/2024    Next appt: Visit date not found    Medication matches medication on Epic list
